# Patient Record
Sex: MALE | Race: WHITE | Employment: FULL TIME | URBAN - METROPOLITAN AREA
[De-identification: names, ages, dates, MRNs, and addresses within clinical notes are randomized per-mention and may not be internally consistent; named-entity substitution may affect disease eponyms.]

---

## 2018-01-24 PROBLEM — E66.01 OBESITY, MORBID (HCC): Status: ACTIVE | Noted: 2018-01-24

## 2018-01-24 PROBLEM — M17.0 ARTHRITIS OF BOTH KNEES: Status: ACTIVE | Noted: 2018-01-24

## 2018-06-27 ENCOUNTER — HOSPITAL ENCOUNTER (OUTPATIENT)
Dept: LAB | Age: 58
Discharge: HOME | End: 2018-06-27
Attending: ORTHOPAEDIC SURGERY
Payer: COMMERCIAL

## 2018-06-27 VITALS — HEIGHT: 66 IN | WEIGHT: 230 LBS | BODY MASS INDEX: 36.96 KG/M2

## 2018-06-27 LAB — POTASSIUM SERPL-SCNC: 3.5 MMOL/L (ref 3.5–5.1)

## 2018-06-27 PROCEDURE — 36415 COLL VENOUS BLD VENIPUNCTURE: CPT | Performed by: ANESTHESIOLOGY

## 2018-06-27 PROCEDURE — 84132 ASSAY OF SERUM POTASSIUM: CPT | Performed by: ANESTHESIOLOGY

## 2018-06-27 NOTE — PERIOP NOTES
Patient's wife verified name, , and surgery as listed in [facility]. Type 1B surgery, PAT phone assessment complete. Orders not received. Labs per surgeon: no orders at this time   Labs per anesthesia protocol: K+- drawn today within anesthesia guidelines. Patient's wife answered medical/surgical history questions at their best of ability. All prior to admission medications documented in [facility]. Patient's wife instructed to take the following medications the day of surgery according to anesthesia guidelines with a small sip of water: norvasc. Hold all vitamins 7 days prior to surgery and NSAIDS 5 days prior to surgery. Medications to be held - aleve. Patient's wife instructed on the following:  Arrive at A Entrance, time of arrival to be called the day before by 1700  NPO after midnight including gum, mints, and ice chips  Responsible adult must drive patient to the hospital, stay during surgery, and patient will need supervision 24 hours after anesthesia  Use antibacterial soap in shower the night before surgery and on the morning of surgery  Leave all valuables (money and jewelry) at home but bring insurance card and ID on DOS  Do not wear make-up, nail polish, lotions, cologne, perfumes, powders, or oil on skin. Patient teach back successful and patient demonstrates knowledge of instruction.

## 2018-06-27 NOTE — PERIOP NOTES
Pt came to o/p registration stating that he had missed a call and because he is [place], he\" just came to see\" what the phone call was pertaining to. Pt informed that he will be called for a phone assessment from a preassessment nurse for a pre-surgical anesthesia assessment. Pt states he is [place] and would like us to call his wife, Mustapha Medina, to complete the assessment. Obtained pt's wife's work #[phone] and informed pt we will call his wife today. This RN asked pt if he had hx of anemia or took a diuretic medication- pt denies hx of anemia but states does take a \"water pill\" for blood pressure. Pt instructed to go to o/p lab at Entrance B for K+ collection (required per anesthesia protocol) prior to leaving the dg today.

## 2018-06-28 ENCOUNTER — HOSPITAL ENCOUNTER (OUTPATIENT)
Dept: SURGERY | Age: 58
Discharge: HOME | End: 2018-06-28

## 2018-06-30 PROBLEM — M17.11 OSTEOARTHRITIS OF RIGHT KNEE: Status: ACTIVE | Noted: 2018-06-30

## 2018-06-30 PROBLEM — S83.231A COMPLEX TEAR OF MEDIAL MENISCUS OF RIGHT KNEE: Status: ACTIVE | Noted: 2018-06-30

## 2018-06-30 NOTE — BRIEF OP NOTE
BRIEF OPERATIVE NOTE    Date of Procedure: 7/6/2018     Preoperative Diagnosis:  MEDIAL MENISCAL TEAR RIGHT KNEE      OA RIGHT KNEE    Postoperative Diagnosis:  SAME      LATERAL MENISCAL TEAR RIGHT KNEE    Procedure(s): ARTHROSCOPY RIGHT KNEE PARTIAL MEDIAL AND LATERAL MENISECTOMIES    Surgeon(s) and Role:     * Gali Wilson MD - Primary         Assistant Staff: None      Surgical Staff:  Circ-1: (Unknown)  Scrub Tech-1: (Unknown)  Scrub Tech-2: (Unknown)  Scrub Tech-3: (Unknown)  No case tracking events are documented in the log. Anesthesia:  GENERAL    Estimated Blood Loss: 10 cc.       Complications: Letty Giraldo MD

## 2018-06-30 NOTE — H&P
[facility] HISTORY AND PHYSICAL    Subjective:     Patient is a 59 y.o. MALE WITH RIGHT KNEE PAIN. SEE OFFICE NOTE. Patient Active Problem List    Diagnosis Date Noted    Complex tear of medial meniscus of right knee 06/30/2018    Osteoarthritis of right knee 06/30/2018    Obesity, morbid ([address] 01/24/2018    Arthritis of both knees 01/24/2018    Elevated ferritin 07/14/2016    Hyperlipidemia 12/16/2015    Shoulder arthritis 49/60/3342    Umbilical hernia 42/39/0754    HTN (hypertension), benign 10/13/2015    Encounter for long-term (current) drug use 10/13/2015     Past Medical History:   Diagnosis Date    Arthritis     Encounter for long-term (current) drug use 10/13/2015    HTN (hypertension), benign 10/13/2015    Controlled with meds     Hypercholesteremia     Controlled with meds     Snores       Past Surgical History:   Procedure Laterality Date    HX ORTHOPAEDIC Left     left knee    HX ORTHOPAEDIC  3/1976    left shoulder    HX OTHER SURGICAL      sinus      Prior to Admission medications    Medication Sig Start Date End Date Taking? Authorizing Provider   valsartan-hydroCHLOROthiazide (DIOVAN-HCT) 160-12.5 mg per tablet Take 1 Tab by mouth daily. 7/19/17   Junior Serrano MD   gemfibrozil (LOPID) 600 mg tablet Take 1 Tab by mouth daily. 7/19/17   Junior Serrano MD   amLODIPine (NORVASC) 5 mg tablet Take 1 Tab by mouth daily. 7/19/17   Junior Serrano MD   naproxen sodium (ALEVE) 220 mg tablet Take 1 Tab by mouth two (2) times daily as needed.  1/17/17   Junior Serrano MD     No Known Allergies   Social History   Substance Use Topics    Smoking status: Light Tobacco Smoker    Smokeless tobacco: Never Used      Comment: Occassionally smokes/chews a cigar     Alcohol use 0.0 oz/week     0 Standard drinks or equivalent per week      Comment: occasional liquor/wine/beer      Family History   Problem Relation Age of Onset    Hypertension Mother    [facility] Hypertension Father     Stroke Father       Review of Systems  A comprehensive review of systems was negative except for that written in the HPI. Objective:     No data found. There were no vitals taken for this visit. General:  Alert, cooperative, no distress, appears stated age. Head:  Normocephalic, without obvious abnormality, atraumatic. Eyes:  Conjunctivae/corneas clear. PERRL, EOMs intact. Fundi benign                   Back:   Symmetric, no curvature. ROM normal. No CVA tenderness. Lungs:   Clear to auscultation bilaterally. Chest wall:  No tenderness or deformity. Heart:  Regular rate and rhythm, S1, S2 normal, no murmur, click, rub or gallop. Extremities: Extremities normal, atraumatic, no cyanosis or edema. Pulses: 2+ and symmetric all extremities. Skin: Skin color, texture, turgor normal. No rashes or lesions   Lymph nodes: Cervical, supraclavicular, and axillary nodes normal.   Neurologic: CNII-XII intact. Normal strength, sensation and reflexes throughout. Assessment:     Principal Problem:    Complex tear of medial meniscus of right knee (6/30/2018)    Active Problems:    Osteoarthritis of right knee (6/30/2018)        Plan:     The various methods of treatment have been discussed with the patient and family. PATIENT HAS EXHAUSTED NON-OPERATIVE MODALITIES     After consideration of risks, benefits and other options for treatment, the patient has consented to surgical intervention.     SEE OFFICE NOTE    Sophia Garcia MD

## 2018-07-05 ENCOUNTER — ANESTHESIA EVENT (OUTPATIENT)
Dept: SURGERY | Age: 58
End: 2018-07-05
Payer: COMMERCIAL

## 2018-07-05 RX ORDER — SODIUM CHLORIDE 0.9 % (FLUSH) 0.9 %
5-10 SYRINGE (ML) INJECTION EVERY 8 HOURS
Status: CANCELLED | OUTPATIENT
Start: 2018-07-05

## 2018-07-05 RX ORDER — SODIUM CHLORIDE 0.9 % (FLUSH) 0.9 %
5-10 SYRINGE (ML) INJECTION AS NEEDED
Status: CANCELLED | OUTPATIENT
Start: 2018-07-05

## 2018-07-06 ENCOUNTER — ANESTHESIA (OUTPATIENT)
Dept: SURGERY | Age: 58
End: 2018-07-06
Payer: COMMERCIAL

## 2018-07-06 ENCOUNTER — HOSPITAL ENCOUNTER (OUTPATIENT)
Age: 58
Setting detail: OUTPATIENT SURGERY
Discharge: HOME | End: 2018-07-06
Attending: ORTHOPAEDIC SURGERY | Admitting: ORTHOPAEDIC SURGERY
Payer: COMMERCIAL

## 2018-07-06 VITALS
BODY MASS INDEX: 36.96 KG/M2 | RESPIRATION RATE: 16 BRPM | HEIGHT: 66 IN | DIASTOLIC BLOOD PRESSURE: 89 MMHG | HEART RATE: 72 BPM | TEMPERATURE: 98.1 F | OXYGEN SATURATION: 98 % | WEIGHT: 230 LBS | SYSTOLIC BLOOD PRESSURE: 136 MMHG

## 2018-07-06 PROBLEM — S83.281A TEAR OF LATERAL MENISCUS OF RIGHT KNEE: Status: ACTIVE | Noted: 2018-07-06

## 2018-07-06 PROCEDURE — 76010000138 HC OR TIME 0.5 TO 1 HR: Performed by: ORTHOPAEDIC SURGERY

## 2018-07-06 PROCEDURE — 74011250636 HC RX REV CODE- 250/636

## 2018-07-06 PROCEDURE — 77030033005 HC TBNG ARTHSC PMP STRY -B: Performed by: ORTHOPAEDIC SURGERY

## 2018-07-06 PROCEDURE — 77030003602 HC NDL NRV BLK BBMI -B: Performed by: ANESTHESIOLOGY

## 2018-07-06 PROCEDURE — 77030003666 HC NDL SPINAL BD -A: Performed by: ORTHOPAEDIC SURGERY

## 2018-07-06 PROCEDURE — 77030006891 HC BLD SHV RESECT STRY -B: Performed by: ORTHOPAEDIC SURGERY

## 2018-07-06 PROCEDURE — 77030020753 HC CUF TRNQT 1BLA STRY -B: Performed by: ORTHOPAEDIC SURGERY

## 2018-07-06 PROCEDURE — 77030018836 HC SOL IRR NACL ICUM -A: Performed by: ORTHOPAEDIC SURGERY

## 2018-07-06 PROCEDURE — 76060000032 HC ANESTHESIA 0.5 TO 1 HR: Performed by: ORTHOPAEDIC SURGERY

## 2018-07-06 PROCEDURE — 77030006668 HC BLD SHV MENSCS STRY -B: Performed by: ORTHOPAEDIC SURGERY

## 2018-07-06 PROCEDURE — 74011000250 HC RX REV CODE- 250: Performed by: ORTHOPAEDIC SURGERY

## 2018-07-06 PROCEDURE — 74011000250 HC RX REV CODE- 250: Performed by: ANESTHESIOLOGY

## 2018-07-06 PROCEDURE — 77030002916 HC SUT ETHLN J&J -A: Performed by: ORTHOPAEDIC SURGERY

## 2018-07-06 PROCEDURE — 74011250637 HC RX REV CODE- 250/637: Performed by: ANESTHESIOLOGY

## 2018-07-06 PROCEDURE — 74011250636 HC RX REV CODE- 250/636: Performed by: ORTHOPAEDIC SURGERY

## 2018-07-06 PROCEDURE — 77030020143 HC AIRWY LARYN INTUB CGAS -A: Performed by: ANESTHESIOLOGY

## 2018-07-06 PROCEDURE — 74011250636 HC RX REV CODE- 250/636: Performed by: ANESTHESIOLOGY

## 2018-07-06 PROCEDURE — 74011000250 HC RX REV CODE- 250

## 2018-07-06 PROCEDURE — 77030022036 HC BLD SHV TOMCAT STRY -B: Performed by: ORTHOPAEDIC SURGERY

## 2018-07-06 PROCEDURE — 76210000006 HC OR PH I REC 0.5 TO 1 HR: Performed by: ORTHOPAEDIC SURGERY

## 2018-07-06 PROCEDURE — 77030020782 HC GWN BAIR PAWS FLX 3M -B: Performed by: ANESTHESIOLOGY

## 2018-07-06 RX ORDER — DEXAMETHASONE SODIUM PHOSPHATE 4 MG/ML
INJECTION, SOLUTION INTRA-ARTICULAR; INTRALESIONAL; INTRAMUSCULAR; INTRAVENOUS; SOFT TISSUE AS NEEDED
Status: DISCONTINUED | OUTPATIENT
Start: 2018-07-06 | End: 2018-07-06 | Stop reason: HOSPADM

## 2018-07-06 RX ORDER — SODIUM CHLORIDE, SODIUM LACTATE, POTASSIUM CHLORIDE, CALCIUM CHLORIDE 600; 310; 30; 20 MG/100ML; MG/100ML; MG/100ML; MG/100ML
100 INJECTION, SOLUTION INTRAVENOUS CONTINUOUS
Status: DISCONTINUED | OUTPATIENT
Start: 2018-07-06 | End: 2018-07-06 | Stop reason: HOSPADM

## 2018-07-06 RX ORDER — OXYCODONE HYDROCHLORIDE 5 MG/1
10 TABLET ORAL
Status: DISCONTINUED | OUTPATIENT
Start: 2018-07-06 | End: 2018-07-06 | Stop reason: HOSPADM

## 2018-07-06 RX ORDER — ROPIVACAINE HYDROCHLORIDE 5 MG/ML
INJECTION, SOLUTION EPIDURAL; INFILTRATION; PERINEURAL AS NEEDED
Status: DISCONTINUED | OUTPATIENT
Start: 2018-07-06 | End: 2018-07-06 | Stop reason: HOSPADM

## 2018-07-06 RX ORDER — SODIUM CHLORIDE 0.9 % (FLUSH) 0.9 %
5-10 SYRINGE (ML) INJECTION AS NEEDED
Status: DISCONTINUED | OUTPATIENT
Start: 2018-07-06 | End: 2018-07-06 | Stop reason: HOSPADM

## 2018-07-06 RX ORDER — DEXAMETHASONE SODIUM PHOSPHATE 100 MG/10ML
INJECTION INTRAMUSCULAR; INTRAVENOUS AS NEEDED
Status: DISCONTINUED | OUTPATIENT
Start: 2018-07-06 | End: 2018-07-06 | Stop reason: HOSPADM

## 2018-07-06 RX ORDER — ACETAMINOPHEN 500 MG
1000 TABLET ORAL ONCE
Status: COMPLETED | OUTPATIENT
Start: 2018-07-06 | End: 2018-07-06

## 2018-07-06 RX ORDER — LIDOCAINE HYDROCHLORIDE 10 MG/ML
0.3 INJECTION INFILTRATION; PERINEURAL ONCE
Status: COMPLETED | OUTPATIENT
Start: 2018-07-06 | End: 2018-07-06

## 2018-07-06 RX ORDER — MIDAZOLAM HYDROCHLORIDE 1 MG/ML
2 INJECTION, SOLUTION INTRAMUSCULAR; INTRAVENOUS
Status: COMPLETED | OUTPATIENT
Start: 2018-07-06 | End: 2018-07-06

## 2018-07-06 RX ORDER — HYDROMORPHONE HYDROCHLORIDE 2 MG/ML
0.5 INJECTION, SOLUTION INTRAMUSCULAR; INTRAVENOUS; SUBCUTANEOUS
Status: DISCONTINUED | OUTPATIENT
Start: 2018-07-06 | End: 2018-07-06 | Stop reason: HOSPADM

## 2018-07-06 RX ORDER — KETOROLAC TROMETHAMINE 30 MG/ML
INJECTION, SOLUTION INTRAMUSCULAR; INTRAVENOUS AS NEEDED
Status: DISCONTINUED | OUTPATIENT
Start: 2018-07-06 | End: 2018-07-06 | Stop reason: HOSPADM

## 2018-07-06 RX ORDER — ONDANSETRON 2 MG/ML
INJECTION INTRAMUSCULAR; INTRAVENOUS AS NEEDED
Status: DISCONTINUED | OUTPATIENT
Start: 2018-07-06 | End: 2018-07-06 | Stop reason: HOSPADM

## 2018-07-06 RX ORDER — PROPOFOL 10 MG/ML
INJECTION, EMULSION INTRAVENOUS AS NEEDED
Status: DISCONTINUED | OUTPATIENT
Start: 2018-07-06 | End: 2018-07-06 | Stop reason: HOSPADM

## 2018-07-06 RX ORDER — CEFAZOLIN SODIUM/WATER 2 G/20 ML
2 SYRINGE (ML) INTRAVENOUS ONCE
Status: COMPLETED | OUTPATIENT
Start: 2018-07-06 | End: 2018-07-06

## 2018-07-06 RX ORDER — LIDOCAINE HYDROCHLORIDE 20 MG/ML
INJECTION, SOLUTION EPIDURAL; INFILTRATION; INTRACAUDAL; PERINEURAL AS NEEDED
Status: DISCONTINUED | OUTPATIENT
Start: 2018-07-06 | End: 2018-07-06 | Stop reason: HOSPADM

## 2018-07-06 RX ORDER — FENTANYL CITRATE 50 UG/ML
INJECTION, SOLUTION INTRAMUSCULAR; INTRAVENOUS AS NEEDED
Status: DISCONTINUED | OUTPATIENT
Start: 2018-07-06 | End: 2018-07-06 | Stop reason: HOSPADM

## 2018-07-06 RX ORDER — LIDOCAINE HYDROCHLORIDE AND EPINEPHRINE 10; 10 MG/ML; UG/ML
INJECTION, SOLUTION INFILTRATION; PERINEURAL AS NEEDED
Status: DISCONTINUED | OUTPATIENT
Start: 2018-07-06 | End: 2018-07-06 | Stop reason: HOSPADM

## 2018-07-06 RX ADMIN — FENTANYL CITRATE 50 MCG: 50 INJECTION, SOLUTION INTRAMUSCULAR; INTRAVENOUS at 07:15

## 2018-07-06 RX ADMIN — ACETAMINOPHEN 1000 MG: 500 TABLET, FILM COATED ORAL at 06:16

## 2018-07-06 RX ADMIN — HYDROMORPHONE HYDROCHLORIDE 0.5 MG: 2 INJECTION, SOLUTION INTRAMUSCULAR; INTRAVENOUS; SUBCUTANEOUS at 08:08

## 2018-07-06 RX ADMIN — PROPOFOL 200 MG: 10 INJECTION, EMULSION INTRAVENOUS at 07:14

## 2018-07-06 RX ADMIN — LIDOCAINE HYDROCHLORIDE 100 MG: 20 INJECTION, SOLUTION EPIDURAL; INFILTRATION; INTRACAUDAL; PERINEURAL at 07:14

## 2018-07-06 RX ADMIN — HYDROMORPHONE HYDROCHLORIDE 0.5 MG: 2 INJECTION, SOLUTION INTRAMUSCULAR; INTRAVENOUS; SUBCUTANEOUS at 08:00

## 2018-07-06 RX ADMIN — SODIUM CHLORIDE, SODIUM LACTATE, POTASSIUM CHLORIDE, AND CALCIUM CHLORIDE: 600; 310; 30; 20 INJECTION, SOLUTION INTRAVENOUS at 07:30

## 2018-07-06 RX ADMIN — SODIUM CHLORIDE, SODIUM LACTATE, POTASSIUM CHLORIDE, AND CALCIUM CHLORIDE 100 ML/HR: 600; 310; 30; 20 INJECTION, SOLUTION INTRAVENOUS at 06:31

## 2018-07-06 RX ADMIN — Medication 2 G: at 07:09

## 2018-07-06 RX ADMIN — ONDANSETRON 4 MG: 2 INJECTION INTRAMUSCULAR; INTRAVENOUS at 07:14

## 2018-07-06 RX ADMIN — LIDOCAINE HYDROCHLORIDE 0.3 ML: 10 INJECTION, SOLUTION INFILTRATION; PERINEURAL at 06:30

## 2018-07-06 RX ADMIN — MIDAZOLAM HYDROCHLORIDE 2 MG: 1 INJECTION, SOLUTION INTRAMUSCULAR; INTRAVENOUS at 06:52

## 2018-07-06 RX ADMIN — FENTANYL CITRATE 50 MCG: 50 INJECTION, SOLUTION INTRAMUSCULAR; INTRAVENOUS at 07:09

## 2018-07-06 RX ADMIN — KETOROLAC TROMETHAMINE 30 MG: 30 INJECTION, SOLUTION INTRAMUSCULAR; INTRAVENOUS at 07:23

## 2018-07-06 RX ADMIN — DEXAMETHASONE SODIUM PHOSPHATE 10 MG: 4 INJECTION, SOLUTION INTRA-ARTICULAR; INTRALESIONAL; INTRAMUSCULAR; INTRAVENOUS; SOFT TISSUE at 07:14

## 2018-07-06 NOTE — DISCHARGE INSTRUCTIONS
Knee Arthroscopy: What to Expect at Home  Your Recovery    Arthroscopy is a way to find problems and do surgery inside a joint without making a large cut (incision). Your doctor put a lighted tube with a tiny camera-called an arthroscope, or scope-and surgical tools through small incisions in your knee. You will feel tired for several days. Your knee will be swollen, and you may notice that your skin is a different color near the cuts (incisions). The swelling is normal and will start to go away in a few days. Keeping your leg higher than your heart will help with swelling and pain. You will probably need about 6 weeks to recover. If your doctor repaired damaged tissue, recovery will take longer. You may have to limit your activity until your knee strength and movement return to normal. You may also be in a physical rehabilitation (rehab) program.  You may be able to return to a desk job or your normal routine in a few days. But if you do physical labor, it may be as long as 2 months before you can return to work. This care sheet gives you a general idea about how long it will take for you to recover. But each person recovers at a different pace. Follow the steps below to get better as quickly as possible. How can you care for yourself at home? Activity  ? · Rest when you feel tired. Getting enough sleep will help you recover. Use pillows to raise your ankle and leg above the level of your heart. ? · Try to walk each day, after your doctor has said you can. Start by walking a little more than you did the day before. Bit by bit, increase the amount you walk. Walking boosts blood flow and helps prevent pneumonia and constipation. ? · You may have a brace or crutches or both. ? · Your doctor will tell you how often and how much you can move your leg and knee. ? · If you have a desk job, you may be able to return to work a few days after the surgery.  If you lift things or stand or walk a lot at work, it may be as long as 2 months before you can return. ? · You can take a shower 48 to 72 hours after surgery and clean the incisions with regular soap and water. Do not take a bath or soak your knee until your doctor says it is okay. ? · Ask your doctor when you can drive again. ? · If you had a repair of torn tissue, follow your doctor's instructions for lifting things or moving your knee. Diet  ? · You can eat your normal diet. If your stomach is upset, try bland, low-fat foods like plain rice, broiled chicken, toast, and yogurt. ? · Drink plenty of fluids, unless your doctor tells you not to. ? · You may notice that your bowel movements are not regular right after your surgery. This is common. Try to avoid constipation and straining with bowel movements. You may want to take a fiber supplement every day. If you have not had a bowel movement after a couple of days, ask your doctor about taking a mild laxative. Medicines  ? · Your doctor will tell you if and when you can restart your medicines. He or she will also give you instructions about taking any new medicines. ? · If you take blood thinners, such as warfarin (Coumadin), clopidogrel (Plavix), or aspirin, be sure to talk to your doctor. He or she will tell you if and when to start taking those medicines again. Make sure that you understand exactly what your doctor wants you to do. ? · Be safe with medicines. Take pain medicines exactly as directed. ¨ If the doctor gave you a prescription medicine for pain, take it as prescribed. ¨ If you are not taking a prescription pain medicine, ask your doctor if you can take an over-the-counter medicine. ? · If you think your pain medicine is making you sick to your stomach:  ¨ Take your medicine after meals (unless your doctor has told you not to). ¨ Ask your doctor for a different pain medicine. ? · If your doctor prescribed antibiotics, take them as directed.  Do not stop taking them just because you feel better. You need to take the full course of antibiotics. Incision care  ? · If you have a dressing over your cuts (incisions), keep it clean and dry. You may remove it 48 to 72 hours after the surgery. ? · If your incisions are open to the air, keep the area clean and dry. ? · If you have strips of tape on the incisions, leave the tape on for a week or until it falls off. Exercise  ? · Move your toes and ankle as much as your bandages will allow. ? · Bend and straighten your knee slowly several times during the day. ? · Depending on why you had the surgery, you may have to do ankle and leg exercises. Your doctor or physical therapist will give you exercises as part of a rehabilitation program.   ? · Stop any activity that causes sharp pain. Talk to your doctor or physical therapist about what sports or other exercise you can do. Ice and elevation  ? · To reduce swelling and pain, put ice or a cold pack on your knee for 10 to 20 minutes at a time. Do this every 1 to 2 hours. Put a thin cloth between the ice and your skin. Follow-up care is a key part of your treatment and safety. Be sure to make and go to all appointments, and call your doctor if you are having problems. It's also a good idea to know your test results and keep a list of the medicines you take. When should you call for help? Call 911 anytime you think you may need emergency care. For example, call if:  ? · You passed out (lost consciousness). ? · You have severe trouble breathing. ? · You have sudden chest pain and shortness of breath, or you cough up blood. ?Call your doctor now or seek immediate medical care if:  ? · Your foot or toes are numb or tingling. ? · Your foot is cool or pale, or it changes color. ? · You have signs of a blood clot, such as:  ¨ Pain in your calf, back of the knee, thigh, or groin. ¨ Redness and swelling in your leg or groin.    ? · You are sick to your stomach or cannot keep fluids down.   ? · You have pain that does not get better after you take pain medicine. ? · You have loose stitches, or your incision comes open. ? · Bright red blood has soaked through the bandage over your incision. ? · You have signs of infection, such as:  ¨ Increased pain, swelling, warmth, or redness. ¨ Red streaks leading from the incisions. ¨ Pus draining from the incisions. ¨ A fever. ? Watch closely for any changes in your health, and be sure to contact your doctor if:  ? · You do not have a bowel movement after taking a laxative. Where can you learn more? Go to http://cyndy-gema.info/. Enter Y744 in the search box to learn more about \"Knee Arthroscopy: What to Expect at Home. \"  Current as of: March 21, 2017  Content Version: 11.4  © 2079-0111 [employer]. Care instructions adapted under license by [employer] (which disclaims liability or warranty for this information). If you have questions about a medical condition or this instruction, always ask your healthcare professional. [address] any warranty or liability for your use of this information.

## 2018-07-06 NOTE — IP AVS SNAPSHOT
[facility] [address] 
[phone] Patient: Rylie Nino MRN: [id number] SXA:[id number] About your hospitalization You were admitted on:  July 6, 2018 You last received care in the:  [facility] PACU You were discharged on:  July 6, 2018 Why you were hospitalized Your primary diagnosis was:  Complex Tear Of Medial Meniscus Of Right Knee Your diagnoses also included:  Osteoarthritis Of Right Knee, Tear Of Lateral Meniscus Of Right Knee Follow-up Information Follow up With Details Comments Contact Info Clarita Lerma MD   [address] [facility] [place] [address] 
[phone] Your Scheduled Appointments Wednesday July 25, 2018  9:15 AM EDT  
6 MONTH with Clarita Lerma MD  
[facility] ([address] [place] [address]) [address] [place] [address] [place] [address] [phone] Discharge Orders None A check alvin indicates which time of day the medication should be taken. My Medications ASK your doctor about these medications Instructions Each Dose to Equal  
 Morning Noon Evening Bedtime ALEVE 220 mg tablet Generic drug:  naproxen sodium Your last dose was: Your next dose is: Take 1 Tab by mouth two (2) times daily as needed. 220 mg  
    
   
   
   
  
 amLODIPine 5 mg tablet Commonly known as:  Tommy Kristy Your last dose was: Your next dose is: Take 1 Tab by mouth daily. 5 mg  
    
   
   
   
  
 gemfibrozil 600 mg tablet Commonly known as:  LOPID Your last dose was: Your next dose is: Take 1 Tab by mouth daily. 600 mg  
    
   
   
   
  
 valsartan-hydroCHLOROthiazide 160-12.5 mg per tablet Commonly known as:  DIOVAN-HCT Your last dose was: Your next dose is: Take 1 Tab by mouth daily. 1 Tab Discharge Instructions Knee Arthroscopy: What to Expect at [facility] Your Recovery Arthroscopy is a way to find problems and do surgery inside a joint without making a large cut (incision). Your doctor put a lighted tube with a tiny camera-called an arthroscope, or scope-and surgical tools through small incisions in your knee. You will feel tired for several days. Your knee will be swollen, and you may notice that your skin is a different color near the cuts (incisions). The swelling is normal and will start to go away in a few days. Keeping your leg higher than your heart will help with swelling and pain. You will probably need about 6 weeks to recover. If your doctor repaired damaged tissue, recovery will take longer. You may have to limit your activity until your knee strength and movement return to normal. You may also be in a physical rehabilitation (rehab) program. 
You may be able to return to a desk job or your normal routine in a few days. But if you do physical labor, it may be as long as 2 months before you can return to work. This care sheet gives you a general idea about how long it will take for you to recover. But each person recovers at a different pace. Follow the steps below to get better as quickly as possible. How can you care for yourself at home? Activity ? · Rest when you feel tired. Getting enough sleep will help you recover. Use pillows to raise your ankle and leg above the level of your heart. ? · Try to walk each day, after your doctor has said you can. Start by walking a little more than you did the day before. Bit by bit, increase the amount you walk. Walking boosts blood flow and helps prevent pneumonia and constipation. ? · You may have a brace or crutches or both. ? · Your doctor will tell you how often and how much you can move your leg and knee.   
? · If you have a desk job, you may be able to return to work a few days after the surgery. If you lift things or stand or walk a lot at work, it may be as long as 2 months before you can return. ? · You can take a shower 48 to 72 hours after surgery and clean the incisions with regular soap and water. Do not take a bath or soak your knee until your doctor says it is okay. ? · Ask your doctor when you can drive again. ? · If you had a repair of torn tissue, follow your doctor's instructions for lifting things or moving your knee. Diet ? · You can eat your normal diet. If your stomach is upset, try bland, low-fat foods like plain rice, broiled chicken, toast, and yogurt. ? · Drink plenty of fluids, unless your doctor tells you not to. ? · You may notice that your bowel movements are not regular right after your surgery. This is common. Try to avoid constipation and straining with bowel movements. You may want to take a fiber supplement every day. If you have not had a bowel movement after a couple of days, ask your doctor about taking a mild laxative. Medicines ? · Your doctor will tell you if and when you can restart your medicines. He or she will also give you instructions about taking any new medicines. ? · If you take blood thinners, such as warfarin (Coumadin), clopidogrel (Plavix), or aspirin, be sure to talk to your doctor. He or she will tell you if and when to start taking those medicines again. Make sure that you understand exactly what your doctor wants you to do. ? · Be safe with medicines. Take pain medicines exactly as directed. ¨ If the doctor gave you a prescription medicine for pain, take it as prescribed. ¨ If you are not taking a prescription pain medicine, ask your doctor if you can take an over-the-counter medicine. ? · If you think your pain medicine is making you sick to your stomach: 
¨ Take your medicine after meals (unless your doctor has told you not to). ¨ Ask your doctor for a different pain medicine. ? · If your doctor prescribed antibiotics, take them as directed. Do not stop taking them just because you feel better. You need to take the full course of antibiotics. Incision care ? · If you have a dressing over your cuts (incisions), keep it clean and dry. You may remove it 48 to 72 hours after the surgery. ? · If your incisions are open to the air, keep the area clean and dry. ? · If you have strips of tape on the incisions, leave the tape on for a week or until it falls off. Exercise ? · Move your toes and ankle as much as your bandages will allow. ? · Bend and straighten your knee slowly several times during the day. ? · Depending on why you had the surgery, you may have to do ankle and leg exercises. Your doctor or physical therapist will give you exercises as part of a rehabilitation program.  
? · Stop any activity that causes sharp pain. Talk to your doctor or physical therapist about what sports or other exercise you can do. Ice and elevation ? · To reduce swelling and pain, put ice or a cold pack on your knee for 10 to 20 minutes at a time. Do this every 1 to 2 hours. Put a thin cloth between the ice and your skin. Follow-up care is a key part of your treatment and safety. Be sure to make and go to all appointments, and call your doctor if you are having problems. It's also a good idea to know your test results and keep a list of the medicines you take. When should you call for help? Call 911 anytime you think you may need emergency care. For example, call if: 
? · You passed out (lost consciousness). ? · You have severe trouble breathing. ? · You have sudden chest pain and shortness of breath, or you cough up blood. ?Call your doctor now or seek immediate medical care if: 
? · Your foot or toes are numb or tingling. ? · Your foot is cool or pale, or it changes color. ? · You have signs of a blood clot, such as: 
¨ Pain in your calf, back of the knee, thigh, or groin. ¨ Redness and swelling in your leg or groin. ? · You are sick to your stomach or cannot keep fluids down. ? · You have pain that does not get better after you take pain medicine. ? · You have loose stitches, or your incision comes open. ? · Bright red blood has soaked through the bandage over your incision. ? · You have signs of infection, such as: 
¨ Increased pain, swelling, warmth, or redness. ¨ Red streaks leading from the incisions. ¨ Pus draining from the incisions. ¨ A fever. ? Watch closely for any changes in your health, and be sure to contact your doctor if: 
? · You do not have a bowel movement after taking a laxative. Where can you learn more? Go to http://cyndy-gema.info/. Enter T035 in the search box to learn more about \"Knee Arthroscopy: What to Expect at Home. \" Current as of: March 21, 2017 Content Version: 11.4 © 5919-0634 [employer]. Care instructions adapted under license by [employer] (which disclaims liability or warranty for this information). If you have questions about a medical condition or this instruction, always ask your healthcare professional. [place] [address] any warranty or liability for your use of this information. Introducing [facility] & HEALTH SERVICES! Dear Lisandro: Thank you for requesting a Hailo account. Our records indicate that you already have an active Hailo account. You can access your account anytime at [email or web address]. [employer]ight [employer]/[facility] Did you know that you can access your hospital and ER discharge instructions at any time in Hailo? You can also review all of your test results from your hospital stay or ER visit. Additional Information If you have questions, please visit the Frequently Asked Questions section of the Hailo website at [email or web address]. [employer]/[facility]/. Remember, Hailo is NOT to be used for urgent needs.  For medical emergencies, dial 911. Now available from your iPhone and Android! Introducing Alessandro Stovall As a [facility] patient, I wanted to make you aware of our electronic visit tool called Alessandro Stovall. [facility] [employer] 24/7 allows you to connect within minutes with a medical provider 24 hours a day, seven days a week via a mobile device or tablet or logging into a secure website from your computer. You can access Alessandro Stovall from anywhere in the [place]. A virtual visit might be right for you when you have a simple condition and feel like you just dont want to get out of bed, or cant get away from work for an appointment, when your regular [facility] provider is not available (evenings, weekends or holidays), or when youre out of town and need minor care. Electronic visits cost only $49 and if the [facility] 24/7 provider determines a prescription is needed to treat your condition, one can be electronically transmitted to a nearby pharmacy*. Please take a moment to enroll today if you have not already done so. The enrollment process is free and takes just a few minutes. To enroll, please download the [employer] 24/7 sumeet to your tablet or phone, or visit [email or web address] to enroll on your computer. And, as an [address] [place] [address] patient with a betNOW account, the results of your visits will be scanned into your electronic medical record and your primary care provider will be able to view the scanned results. We urge you to continue to see your regular [facility] provider for your ongoing medical care. And while your primary care provider may not be the one available when you seek a Alessandro Stovall virtual visit, the peace of mind you get from getting a real diagnosis real time can be priceless. For more information on Alessandro Stovall, view our Frequently Asked Questions (FAQs) at [email or web address].  
 
Sincerely, 
 
Ching Sequeira MD 
 Chief Medical Officer Alondra Crespo *:  certain medications cannot be prescribed via Alessandro Stovall Providers Seen During Your Hospitalization Provider Specialty Primary office phone Perry Simpson MD Orthopedic Surgery [phone] Your Primary Care Physician (PCP) Primary Care Physician Office Phone Office Fax [address] [phone] You are allergic to the following No active allergies Recent Documentation Height Weight BMI Smoking Status 1.676 m 104.3 kg 37.12 kg/m2 Light Tobacco Smoker Emergency Contacts Name Discharge Info Relation Home Work [facility] DISCHARGE CAREGIVER [3] Spouse [3] [phone] Patient Belongings The following personal items are in your possession at time of discharge: 
  Dental Appliances: None  Visual Aid: None Please provide this summary of care documentation to your next provider. Signatures-by signing, you are acknowledging that this After Visit Summary has been reviewed with you and you have received a copy. Patient Signature:  ____________________________________________________________ Date:  ____________________________________________________________  
  
[facility] Provider Signature:  ____________________________________________________________ Date:  ____________________________________________________________

## 2018-07-06 NOTE — ANESTHESIA POSTPROCEDURE EVALUATION
Post-Anesthesia Evaluation and Assessment    Patient: Jun Pollard MRN: [id number]  SSN: [id number]    YOB: 1960  Age: 59 y.o. Sex: male       Cardiovascular Function/Vital Signs  Visit Vitals    /89 (BP 1 Location: Left arm, BP Patient Position: At rest)    Pulse 72    Temp 36.7 °C (98.1 °F)    Resp 16    Ht 5' 6\" (1.676 m)    Wt 104.3 kg (230 lb)    SpO2 98%    BMI 37.12 kg/m2       Patient is status post general anesthesia for Procedure(s):  ARTHROSCOPY RIGHT KNEE PARTIAL MEDIAL AND LATERAL MENISECTOMIES. Nausea/Vomiting: None    Postoperative hydration reviewed and adequate. Pain:  Pain Scale 1: Numeric (0 - 10) (07/06/18 0848)  Pain Intensity 1: 0 (07/06/18 0848)   Managed    Neurological Status:   Neuro (WDL): Exceptions to WDL (07/06/18 0848)  Neuro  Neurologic State: Alert (07/06/18 0848)  Orientation Level: Oriented X4 (07/06/18 0848)  Cognition: Follows commands (07/06/18 0848)  Speech: Clear (07/06/18 0848)  LUE Motor Response: Purposeful (07/06/18 0848)  LLE Motor Response: Purposeful (07/06/18 0848)  RUE Motor Response: Purposeful (07/06/18 0848)  RLE Motor Response: Other(comment) (surgery) (07/06/18 0848)   At baseline    Mental Status and Level of Consciousness: Alert and oriented     Pulmonary Status:   O2 Device: Room air (07/06/18 0848)   Adequate oxygenation and airway patent    Complications related to anesthesia: None    Post-anesthesia assessment completed.  No concerns    Signed By: Clara Tan MD     July 6, 2018

## 2018-07-06 NOTE — ANESTHESIA PREPROCEDURE EVALUATION
Anesthetic History   No history of anesthetic complications            Review of Systems / Medical History  Patient summary reviewed and pertinent labs reviewed    Pulmonary                Comments: Heavy snoring   Neuro/Psych   Within defined limits           Cardiovascular    Hypertension              Exercise tolerance: >4 METS     GI/Hepatic/Renal  Within defined limits              Endo/Other        Obesity     Other Findings   Comments: Hard of hearing           Physical Exam    Airway  Mallampati: I  TM Distance: > 6 cm  Neck ROM: normal range of motion   Mouth opening: Normal     Cardiovascular    Rhythm: regular  Rate: normal         Dental  No notable dental hx       Pulmonary  Breath sounds clear to auscultation               Abdominal         Other Findings            Anesthetic Plan    ASA: 2  Anesthesia type: general          Induction: Intravenous  Anesthetic plan and risks discussed with: Patient and Spouse

## 2018-07-06 NOTE — OP NOTES
[address] [place] [address] REPORT    Irene Morin  MR#: [id number]  : 1960  ACCOUNT #: [de-identified]   DATE OF SERVICE: 2018    PREOPERATIVE DIAGNOSES:    1. Medial meniscal tear, right knee. 2.  Osteoarthritis, right knee. POSTOPERATIVE DIAGNOSES:  1. Medial meniscal tear, right knee. 2.  Osteoarthritis, right knee. 3.  Lateral meniscal tear, right knee. PROCEDURE PERFORMED:  Arthroscopy right knee, partial medial and lateral meniscectomies. SURGEON:  Mo Her. Panchito Nesbitt MD         PATHOLOGY:  1. Complex medial meniscal tear. 2.  Degenerative lateral meniscal tear. 3.  Osteoarthritis with grade II through IV changes of the trochlea and medial femoral condyle, grade I changes patella, medial subpatellar, lateral femoral condyle, lateral subpatellar. CPT CODES:  30362    ICD 10 CODES:  S83.231, S83.281, M17.11.    ANESTHESIA:  General.    ESTIMATED BLOOD LOSS:  10 mL    COMPLICATIONS:  None. TOURNIQUET:  No tourniquet. INDICATIONS:  The patient is a 76-year-old gentleman who has developed recalcitrant right knee pain. Preoperative physical exam, radiographs and an MR demonstrate medial meniscal tear as well as osteoarthritis, right knee. The patient has exhausted nonoperative modalities, elected for operative intervention. PROCEDURE:  Following identification of the patient, the patient was taken to the operative suite. Following administration of general anesthesia, administration of 2 grams of IV Ancef, the patient was positioned on the operating table in the supine fashion. The right knee was examined under anesthesia and noted to be stable through full range of motion. At this point, the right leg was then prepped and draped in a sterile fashion. The right knee was injected with 20 mL of 1% Xylocaine with epinephrine. At this point, the 2 arthroscopic portal  sites were identified and incised.   Scope was introduced into the knee.  Diagnostic arthroscopy was then commenced. The suprapatellar pouch, medial and lateral gutters were visualized. There was no loose body, foreign body or pathologic plica. Undersurface patella and trochlear groove were visualized. There was grade I chondromalacia patella, grade II through IV chondromalacia change of the trochlea, which were diffuse and left alone. There was some synovitis and this was debrided using a 5.5 full radius resector. At this point, the scope was advanced in the medial compartment. His leg was flexed 30 degrees. Medial meniscus was visualized in entirety as well as through the compartment. There was a complex unstable tear of the posterior horn of the medial meniscus. We used an upgoing flat-biter and side-biter, a partial medial meniscectomy was then performed. This was contoured with a meniscal shaver as well as an underwater Bovie. Meniscus was probed and noted to be stable. Articular surfaces were notable for grade II through IV changes diffusely over the medial femoral condyle which was left alone, grade I changes medial subpatellar which were left alone. Scope was introduced into the notch. ACL and PCL were intact. The scope was advanced to the lateral compartment and it was put in the figure A central compartment was in the figure-of-four position. Lateral meniscus was visualized in its entirety, both above and below the popliteus hiatus. There was a degenerative lateral meniscal tear. We used an upgoing flat-biter and side-biter, a partial laterals meniscectomy was then performed. This was contoured with a meniscal shaver. Meniscus was probed and noted be stable. Articular surfaces were noted for grade I chondromalacia of the lateral femoral condyle and patella which were left alone. At this point, with the procedure complete, the knee was then copiously irrigated. Arthroscopic equipment was removed from the knee.   The portals were approximated using 3-0 nylon horizontal mattress sutures. The knee was injected with 10 mL of Naropin and 10 mg of dexamethasone. A sterile dressing was applied. The patient was then transferred to the recovery room in stable condition.       MD VICTORINO Garrido / RAOUL  D: 07/06/2018 07:55     T: 07/06/2018 08:45  JOB #: [id number]  CC: Cindy Vogel MD

## 2019-01-30 PROBLEM — M17.11 OSTEOARTHRITIS OF RIGHT KNEE: Status: RESOLVED | Noted: 2018-06-30 | Resolved: 2019-01-30

## 2019-01-30 PROBLEM — S83.231A COMPLEX TEAR OF MEDIAL MENISCUS OF RIGHT KNEE: Status: RESOLVED | Noted: 2018-06-30 | Resolved: 2019-01-30

## 2019-01-30 PROBLEM — S83.281A TEAR OF LATERAL MENISCUS OF RIGHT KNEE: Status: RESOLVED | Noted: 2018-07-06 | Resolved: 2019-01-30

## 2022-03-19 PROBLEM — M17.0 ARTHRITIS OF BOTH KNEES: Status: ACTIVE | Noted: 2018-01-24

## 2022-03-19 PROBLEM — E66.01 OBESITY, MORBID (HCC): Status: ACTIVE | Noted: 2018-01-24

## 2022-07-26 RX ORDER — AMLODIPINE BESYLATE 5 MG/1
TABLET ORAL
Qty: 90 TABLET | Refills: 3 | Status: SHIPPED | OUTPATIENT
Start: 2022-07-26

## 2022-07-26 RX ORDER — VALSARTAN AND HYDROCHLOROTHIAZIDE 160; 12.5 MG/1; MG/1
TABLET, FILM COATED ORAL
Qty: 90 TABLET | Refills: 3 | Status: SHIPPED | OUTPATIENT
Start: 2022-07-26

## 2022-08-16 ENCOUNTER — OFFICE VISIT (OUTPATIENT)
Dept: INTERNAL MEDICINE CLINIC | Facility: CLINIC | Age: 62
End: 2022-08-16
Payer: COMMERCIAL

## 2022-08-16 VITALS
TEMPERATURE: 97.3 F | RESPIRATION RATE: 18 BRPM | HEART RATE: 95 BPM | SYSTOLIC BLOOD PRESSURE: 133 MMHG | OXYGEN SATURATION: 99 % | HEIGHT: 67 IN | WEIGHT: 225 LBS | BODY MASS INDEX: 35.31 KG/M2 | DIASTOLIC BLOOD PRESSURE: 82 MMHG

## 2022-08-16 DIAGNOSIS — E78.2 MIXED HYPERLIPIDEMIA: ICD-10-CM

## 2022-08-16 DIAGNOSIS — I10 ESSENTIAL (PRIMARY) HYPERTENSION: ICD-10-CM

## 2022-08-16 DIAGNOSIS — E66.01 MORBID (SEVERE) OBESITY DUE TO EXCESS CALORIES (HCC): Primary | ICD-10-CM

## 2022-08-16 PROCEDURE — 99213 OFFICE O/P EST LOW 20 MIN: CPT | Performed by: INTERNAL MEDICINE

## 2022-08-16 ASSESSMENT — PATIENT HEALTH QUESTIONNAIRE - PHQ9
1. LITTLE INTEREST OR PLEASURE IN DOING THINGS: 0
SUM OF ALL RESPONSES TO PHQ QUESTIONS 1-9: 0
SUM OF ALL RESPONSES TO PHQ9 QUESTIONS 1 & 2: 0
2. FEELING DOWN, DEPRESSED OR HOPELESS: 0
SUM OF ALL RESPONSES TO PHQ QUESTIONS 1-9: 0

## 2022-08-16 NOTE — PROGRESS NOTES
08/16/2022   Location:[facility] [address] [facility]  [place]  Patient #:  [id number]  YOB: 1960        History of Present Illness     Chief Complaint   Patient presents with    Follow-up Chronic Condition     6 month f/u       Mr. Luciano Rogers is a 59 y.o. male  who presents for Follow up on chronic medical issues. There is compliance and tolerance with medications. Chronic acitive medical issues HTN, HLD,elevated ferritin AND OA. HTN and lipids controlled  on current meds.     Saw Chiropractor had adjustment and it has  helped with tinnitis/dizziness  [place] chiropractor     Last Labs  CBC:   Lab Results   Component Value Date/Time    WBC 8.9 02/15/2022 09:19 AM    RBC 5.28 02/15/2022 09:19 AM    HGB 14.5 02/15/2022 09:19 AM    HCT 44.3 02/15/2022 09:19 AM    MCV 84 02/15/2022 09:19 AM    MCH 27.5 02/15/2022 09:19 AM    MCHC 32.7 02/15/2022 09:19 AM    RDW 14.1 02/15/2022 09:19 AM     02/15/2022 09:19 AM     CMP:    Lab Results   Component Value Date/Time     02/15/2022 09:19 AM    K 4.2 02/15/2022 09:19 AM    CL 98 02/15/2022 09:19 AM    CO2 24 02/15/2022 09:19 AM    BUN 19 02/15/2022 09:19 AM    CREATININE 1.10 02/15/2022 09:19 AM    GFRAA 82 02/15/2022 09:19 AM    AGRATIO 2.1 02/15/2022 09:19 AM    GLUCOSE 127 02/15/2022 09:19 AM    PROT 6.8 02/15/2022 09:19 AM    LABALBU 4.6 02/15/2022 09:19 AM    CALCIUM 9.6 02/15/2022 09:19 AM    BILITOT 0.9 02/15/2022 09:19 AM    ALKPHOS 110 02/15/2022 09:19 AM    AST 21 02/15/2022 09:19 AM    ALT 21 02/15/2022 09:19 AM     FLP:    Lab Results   Component Value Date/Time    TRIG 68 01/29/2021 09:09 AM    HDL 36 01/29/2021 09:09 AM    LDLCALC 61 01/29/2021 09:09 AM    LABVLDL 25 01/28/2020 09:25 AM     TSH:    Lab Results   Component Value Date/Time    TSH 1.650 02/15/2022 09:19 AM     IRON:  No results found for: IRON  Iron Saturation:  No components found for: PERCENTFE  TIBC:  No results found for: TIBC  FERRITIN: Lab Results   Component Value Date/Time    FERRITIN 190 01/29/2021 09:09 AM       No Known Allergies  Past Medical History:   Diagnosis Date    Arthritis     Encounter for long-term (current) drug use 10/13/2015    HTN (hypertension), benign 10/13/2015    Controlled with meds     Hypercholesteremia     Controlled with meds     Snores      Social History     Socioeconomic History    Marital status:      Spouse name: None    Number of children: None    Years of education: None    Highest education level: None   Tobacco Use    Smoking status: Light Smoker     Packs/day: 0.00     Types: Cigarettes    Smokeless tobacco: Never    Tobacco comments:     Quit smoking: Occassionally smokes/chews a cigar   Substance and Sexual Activity    Alcohol use: Yes     Alcohol/week: 0.0 standard drinks    Drug use: No     Past Surgical History:   Procedure Laterality Date    ORTHOPEDIC SURGERY Left     left knee    ORTHOPEDIC SURGERY  3/1976    left shoulder    OTHER SURGICAL HISTORY      sinus     Family History   Problem Relation Age of Onset    Hypertension Mother     Cancer Mother         Metastatic, cause of death    Hypertension Father     Stroke Father      Current Outpatient Medications   Medication Sig Dispense Refill    amLODIPine (NORVASC) 5 MG tablet TAKE 1 TABLET BY MOUTH DAILY 90 tablet 3    valsartan-hydroCHLOROthiazide (DIOVAN-HCT) 160-12.5 MG per tablet TAKE 1 TABLET BY MOUTH DAILY 90 tablet 3    atorvastatin (LIPITOR) 20 MG tablet TAKE 1 TABLET BY MOUTH DAILY      Diclofenac Sodium (PENNSAID) 2 % SOLN APPLY 2 PUMPS TO AFFECTED AREA(S) TWO TIMES A DAY AS DIRECTED      naproxen sodium (ANAPROX) 220 MG tablet Take 220 mg by mouth 2 times daily (with meals)       No current facility-administered medications for this visit.      Health Maintenance   Topic Date Due    Pneumococcal 0-64 years Vaccine (1 - PCV) Never done    HIV screen  Never done    Hepatitis C screen  Never done    DTaP/Tdap/Td vaccine (1 - Tdap) Never done    Diabetes screen  Never done    Colorectal Cancer Screen  Never done    Shingles vaccine (1 of 2) Never done    Lipids  01/29/2022    COVID-19 Vaccine (4 - Booster for Moderna series) 06/03/2022    Flu vaccine (1) 09/01/2022    Depression Screen  02/15/2023    Hepatitis A vaccine  Aged Out    Hepatitis B vaccine  Aged Out    Hib vaccine  Aged Out    Meningococcal (ACWY) vaccine  Aged Out             Review of Systems  Review of Systems   Constitutional:  Negative for fatigue. HENT:  Positive for hearing loss. Respiratory:  Negative for cough and shortness of breath. Cardiovascular:  Negative for chest pain. Gastrointestinal:  Negative for abdominal pain. Genitourinary:  Negative for difficulty urinating. /82 (Site: Left Upper Arm, Position: Sitting)   Pulse 95   Temp 97.3 °F (36.3 °C) (Temporal)   Resp 18   Ht 5' 7\" (1.702 m)   Wt 225 lb (102.1 kg)   SpO2 99%   BMI 35.24 kg/m²     Wt Readings from Last 3 Encounters:   08/16/22 225 lb (102.1 kg)   02/15/22 231 lb 6.4 oz (105 kg)   08/18/21 234 lb (106.1 kg)       Physical Exam    Physical Exam  Vitals and nursing note reviewed. Constitutional:       General: He is not in acute distress. Appearance: He is obese. He is not ill-appearing. HENT:      Head: Normocephalic and atraumatic. Cardiovascular:      Rate and Rhythm: Normal rate and regular rhythm. Pulmonary:      Effort: Pulmonary effort is normal.      Breath sounds: Normal breath sounds. Abdominal:      General: Bowel sounds are normal.      Palpations: Abdomen is soft. Lymphadenopathy:      Cervical: No cervical adenopathy. Skin:     General: Skin is warm and dry. Neurological:      General: No focal deficit present. Mental Status: He is alert. Assessment & Plan    Encounter Diagnoses   Name Primary?     Morbid (severe) obesity due to excess calories ([address] Yes    Essential (primary) hypertension     Mixed hyperlipidemia        The patient is asked to make an attempt to improve diet and exercise patterns to aid in medical management of this problem. Chronic medical issues are stable. No change in medications. Return in about 6 months (around 2/16/2023) for fasting labs, routine follow up of chronic medical issues AWV.         Tu Hernandez MD

## 2022-08-22 ASSESSMENT — ENCOUNTER SYMPTOMS
ABDOMINAL PAIN: 0
SHORTNESS OF BREATH: 0
COUGH: 0

## 2023-01-27 RX ORDER — ATORVASTATIN CALCIUM 20 MG/1
TABLET, FILM COATED ORAL
Qty: 90 TABLET | Refills: 3 | Status: SHIPPED | OUTPATIENT
Start: 2023-01-27

## 2023-02-17 SDOH — HEALTH STABILITY: PHYSICAL HEALTH: ON AVERAGE, HOW MANY DAYS PER WEEK DO YOU ENGAGE IN MODERATE TO STRENUOUS EXERCISE (LIKE A BRISK WALK)?: 7 DAYS

## 2023-02-17 SDOH — HEALTH STABILITY: PHYSICAL HEALTH: ON AVERAGE, HOW MANY MINUTES DO YOU ENGAGE IN EXERCISE AT THIS LEVEL?: 90 MIN

## 2023-02-17 ASSESSMENT — PATIENT HEALTH QUESTIONNAIRE - PHQ9
SUM OF ALL RESPONSES TO PHQ QUESTIONS 1-9: 0
SUM OF ALL RESPONSES TO PHQ QUESTIONS 1-9: 0
2. FEELING DOWN, DEPRESSED OR HOPELESS: 0
SUM OF ALL RESPONSES TO PHQ QUESTIONS 1-9: 0
1. LITTLE INTEREST OR PLEASURE IN DOING THINGS: 0
SUM OF ALL RESPONSES TO PHQ9 QUESTIONS 1 & 2: 0
SUM OF ALL RESPONSES TO PHQ QUESTIONS 1-9: 0

## 2023-02-17 ASSESSMENT — LIFESTYLE VARIABLES
HOW OFTEN DO YOU HAVE SIX OR MORE DRINKS ON ONE OCCASION: 1
HOW OFTEN DO YOU HAVE A DRINK CONTAINING ALCOHOL: 3
HOW OFTEN DO YOU HAVE A DRINK CONTAINING ALCOHOL: 2-4 TIMES A MONTH
HOW MANY STANDARD DRINKS CONTAINING ALCOHOL DO YOU HAVE ON A TYPICAL DAY: 1 OR 2
HOW MANY STANDARD DRINKS CONTAINING ALCOHOL DO YOU HAVE ON A TYPICAL DAY: 1

## 2023-02-21 ENCOUNTER — OFFICE VISIT (OUTPATIENT)
Dept: INTERNAL MEDICINE CLINIC | Facility: CLINIC | Age: 63
End: 2023-02-21
Payer: COMMERCIAL

## 2023-02-21 VITALS
DIASTOLIC BLOOD PRESSURE: 84 MMHG | HEART RATE: 82 BPM | WEIGHT: 228 LBS | TEMPERATURE: 98.1 F | SYSTOLIC BLOOD PRESSURE: 142 MMHG | OXYGEN SATURATION: 99 % | HEIGHT: 67 IN | RESPIRATION RATE: 16 BRPM | BODY MASS INDEX: 35.79 KG/M2

## 2023-02-21 DIAGNOSIS — E66.01 OBESITY, MORBID (HCC): ICD-10-CM

## 2023-02-21 DIAGNOSIS — M15.9 PRIMARY OSTEOARTHRITIS INVOLVING MULTIPLE JOINTS: ICD-10-CM

## 2023-02-21 DIAGNOSIS — I10 ESSENTIAL (PRIMARY) HYPERTENSION: Primary | ICD-10-CM

## 2023-02-21 DIAGNOSIS — E78.2 MIXED HYPERLIPIDEMIA: ICD-10-CM

## 2023-02-21 PROCEDURE — 99213 OFFICE O/P EST LOW 20 MIN: CPT | Performed by: INTERNAL MEDICINE

## 2023-02-21 PROCEDURE — 3074F SYST BP LT 130 MM HG: CPT | Performed by: INTERNAL MEDICINE

## 2023-02-21 PROCEDURE — 3078F DIAST BP <80 MM HG: CPT | Performed by: INTERNAL MEDICINE

## 2023-02-21 SDOH — ECONOMIC STABILITY: HOUSING INSECURITY
IN THE LAST 12 MONTHS, WAS THERE A TIME WHEN YOU DID NOT HAVE A STEADY PLACE TO SLEEP OR SLEPT IN A SHELTER (INCLUDING NOW)?: NO

## 2023-02-21 SDOH — ECONOMIC STABILITY: FOOD INSECURITY: WITHIN THE PAST 12 MONTHS, THE FOOD YOU BOUGHT JUST DIDN'T LAST AND YOU DIDN'T HAVE MONEY TO GET MORE.: NEVER TRUE

## 2023-02-21 SDOH — ECONOMIC STABILITY: INCOME INSECURITY: HOW HARD IS IT FOR YOU TO PAY FOR THE VERY BASICS LIKE FOOD, HOUSING, MEDICAL CARE, AND HEATING?: NOT HARD AT ALL

## 2023-02-21 SDOH — ECONOMIC STABILITY: FOOD INSECURITY: WITHIN THE PAST 12 MONTHS, YOU WORRIED THAT YOUR FOOD WOULD RUN OUT BEFORE YOU GOT MONEY TO BUY MORE.: NEVER TRUE

## 2023-02-21 ASSESSMENT — ENCOUNTER SYMPTOMS: SHORTNESS OF BREATH: 0

## 2023-02-21 NOTE — PROGRESS NOTES
02/21/2023   Location:[facility] [address] [facility]  [place]  Patient #:  [id number]  YOB: 1960        History of Present Illness     Chief Complaint   Patient presents with    Follow-up Chronic Condition     6 month f/u    Hypertension    Cholesterol Problem    Arthritis       Mr. Raymundo Sanchez is a 57 y.o. male  who presents for Follow up on chronic medical issues. There is compliance and tolerance with medications. Chronic acitive medical issues HTN, HLD,elevated ferritin AND OA. HTN and lipids controlled  on current meds. No new complaints.      Last Labs  CBC:   Lab Results   Component Value Date/Time    WBC 8.9 02/15/2022 09:19 AM    RBC 5.28 02/15/2022 09:19 AM    HGB 14.5 02/15/2022 09:19 AM    HCT 44.3 02/15/2022 09:19 AM    MCV 84 02/15/2022 09:19 AM    MCH 27.5 02/15/2022 09:19 AM    MCHC 32.7 02/15/2022 09:19 AM    RDW 14.1 02/15/2022 09:19 AM     02/15/2022 09:19 AM     CMP:    Lab Results   Component Value Date/Time     02/15/2022 09:19 AM    K 4.2 02/15/2022 09:19 AM    CL 98 02/15/2022 09:19 AM    CO2 24 02/15/2022 09:19 AM    BUN 19 02/15/2022 09:19 AM    CREATININE 1.10 02/15/2022 09:19 AM    GFRAA 82 02/15/2022 09:19 AM    AGRATIO 2.1 02/15/2022 09:19 AM    GLUCOSE 127 02/15/2022 09:19 AM    PROT 6.8 02/15/2022 09:19 AM    LABALBU 4.6 02/15/2022 09:19 AM    CALCIUM 9.6 02/15/2022 09:19 AM    BILITOT 0.9 02/15/2022 09:19 AM    ALKPHOS 110 02/15/2022 09:19 AM    AST 21 02/15/2022 09:19 AM    ALT 21 02/15/2022 09:19 AM     HgBA1c:  No results found for: LABA1C  Microalbumen/Creatinine ratio:  No components found for: RUCREAT  FLP:    Lab Results   Component Value Date/Time    TRIG 68 01/29/2021 09:09 AM    HDL 36 01/29/2021 09:09 AM    LDLCALC 61 01/29/2021 09:09 AM    LABVLDL 25 01/28/2020 09:25 AM     TSH:    Lab Results   Component Value Date/Time    TSH 1.650 02/15/2022 09:19 AM       No Known Allergies  Past Medical History:   Diagnosis Date    Arthritis     Encounter for long-term (current) drug use 10/13/2015    HTN (hypertension), benign 10/13/2015    Controlled with meds     Hypercholesteremia     Controlled with meds     Snores      Social History     Socioeconomic History    Marital status:      Spouse name: None    Number of children: None    Years of education: None    Highest education level: None   Tobacco Use    Smoking status: Light Smoker     Packs/day: 0.00     Types: Cigarettes    Smokeless tobacco: Never    Tobacco comments:     Quit smoking: Occassionally smokes/chews a cigar   Substance and Sexual Activity    Alcohol use: Yes     Alcohol/week: 0.0 standard drinks    Drug use: No     Social Determinants of Health     Financial Resource Strain: Low Risk     Difficulty of Paying Living Expenses: Not hard at all   Food Insecurity: No Food Insecurity    Worried About Running Out of Food in the Last Year: Never true    Ran Out of Food in the Last Year: Never true   Transportation Needs: Unknown    Lack of Transportation (Non-Medical):  No   Physical Activity: Sufficiently Active    Days of Exercise per Week: 7 days    Minutes of Exercise per Session: 90 min   Housing Stability: Unknown    Unstable Housing in the Last Year: No     Past Surgical History:   Procedure Laterality Date    ORTHOPEDIC SURGERY Left     left knee    ORTHOPEDIC SURGERY  3/1976    left shoulder    OTHER SURGICAL HISTORY      sinus     Family History   Problem Relation Age of Onset    Hypertension Mother     Cancer Mother         Metastatic, cause of death    Hypertension Father     Stroke Father      Current Outpatient Medications   Medication Sig Dispense Refill    atorvastatin (LIPITOR) 20 MG tablet TAKE 1 TABLET BY MOUTH DAILY 90 tablet 3    amLODIPine (NORVASC) 5 MG tablet TAKE 1 TABLET BY MOUTH DAILY 90 tablet 3    valsartan-hydroCHLOROthiazide (DIOVAN-HCT) 160-12.5 MG per tablet TAKE 1 TABLET BY MOUTH DAILY 90 tablet 3    Diclofenac Sodium (PENNSAID) 2 % SOLN APPLY 2 PUMPS TO AFFECTED AREA(S) TWO TIMES A DAY AS DIRECTED      naproxen sodium (ANAPROX) 220 MG tablet Take 220 mg by mouth 2 times daily (with meals)       No current facility-administered medications for this visit. Health Maintenance   Topic Date Due    Pneumococcal 0-64 years Vaccine (1 - PCV) Never done    HIV screen  Never done    Hepatitis C screen  Never done    DTaP/Tdap/Td vaccine (1 - Tdap) Never done    Diabetes screen  Never done    Colorectal Cancer Screen  Never done    Shingles vaccine (1 of 2) Never done    Lipids  01/29/2022    COVID-19 Vaccine (4 - Booster for Moderna series) 03/31/2022    Flu vaccine (1) Never done    Depression Screen  02/17/2024    Hepatitis A vaccine  Aged Out    Hib vaccine  Aged Out    Meningococcal (ACWY) vaccine  Aged Out             Review of Systems  Review of Systems   Constitutional:  Negative for fatigue and fever. HENT:  Positive for hearing loss. Respiratory:  Negative for cough and shortness of breath. Cardiovascular:  Negative for chest pain. Gastrointestinal:  Negative for abdominal pain. Genitourinary:  Negative for difficulty urinating. Musculoskeletal:  Positive for arthralgias and myalgias. BP (!) 142/84 (Site: Right Upper Arm, Position: Sitting)   Pulse 82   Temp 98.1 °F (36.7 °C) (Temporal)   Resp 16   Ht 5' 7\" (1.702 m)   Wt 228 lb (103.4 kg)   SpO2 99%   BMI 35.71 kg/m²     Wt Readings from Last 3 Encounters:   02/21/23 228 lb (103.4 kg)   08/16/22 225 lb (102.1 kg)   02/15/22 231 lb 6.4 oz (105 kg)       Physical Exam    Physical Exam  Vitals and nursing note reviewed. Constitutional:       Appearance: Normal appearance. HENT:      Head: Normocephalic and atraumatic. Right Ear: Tympanic membrane normal.      Left Ear: Tympanic membrane normal.      Nose: Nose normal.      Mouth/Throat:      Mouth: Mucous membranes are moist.   Eyes:      Extraocular Movements: Extraocular movements intact. Conjunctiva/sclera: Conjunctivae normal.      Pupils: Pupils are equal, round, and reactive to light. Cardiovascular:      Rate and Rhythm: Normal rate and regular rhythm. Pulses: Normal pulses. Pulmonary:      Effort: Pulmonary effort is normal.      Breath sounds: Normal breath sounds. Abdominal:      General: Bowel sounds are normal.      Palpations: Abdomen is soft. Skin:     General: Skin is warm and dry. Neurological:      General: No focal deficit present. Mental Status: He is alert. Assessment & Plan    Encounter Diagnoses   Name Primary? Essential (primary) hypertension Yes    Mixed hyperlipidemia     Obesity, morbid ([facility] [address]     Primary osteoarthritis involving multiple joints        No orders of the defined types were placed in this encounter. Orders Placed This Encounter   Procedures    TSH with Reflex     Standing Status:   Future     Standing Expiration Date:   3/1/2024    Lipid Panel     Standing Status:   Future     Standing Expiration Date:   3/1/2024    CBC with Auto Differential     Standing Status:   Future     Standing Expiration Date:   3/1/2024    Comprehensive Metabolic Panel     Standing Status:   Future     Standing Expiration Date:   3/1/2024       The patient is asked to make an attempt to improve diet and exercise patterns to aid in medical management of this problem. Chronic medical issues are stable. No change in medications. Return for routine follow up of chronic medical issues.         Federico Sultana MD

## 2023-02-27 DIAGNOSIS — I10 ESSENTIAL (PRIMARY) HYPERTENSION: ICD-10-CM

## 2023-02-27 DIAGNOSIS — E78.2 MIXED HYPERLIPIDEMIA: Primary | ICD-10-CM

## 2023-03-01 ASSESSMENT — ENCOUNTER SYMPTOMS
ABDOMINAL PAIN: 0
COUGH: 0

## 2023-03-02 ENCOUNTER — NURSE ONLY (OUTPATIENT)
Dept: INTERNAL MEDICINE CLINIC | Facility: CLINIC | Age: 63
End: 2023-03-02

## 2023-03-02 DIAGNOSIS — E78.2 MIXED HYPERLIPIDEMIA: ICD-10-CM

## 2023-03-02 DIAGNOSIS — I10 ESSENTIAL (PRIMARY) HYPERTENSION: ICD-10-CM

## 2023-03-02 LAB
BASOPHILS # BLD: 0 K/UL (ref 0–0.2)
BASOPHILS NFR BLD: 1 % (ref 0–2)
DIFFERENTIAL METHOD BLD: ABNORMAL
EOSINOPHIL # BLD: 0.2 K/UL (ref 0–0.8)
EOSINOPHIL NFR BLD: 3 % (ref 0.5–7.8)
ERYTHROCYTE [DISTWIDTH] IN BLOOD BY AUTOMATED COUNT: 15.4 % (ref 11.9–14.6)
HCT VFR BLD AUTO: 44.1 % (ref 41.1–50.3)
HGB BLD-MCNC: 14.3 G/DL (ref 13.6–17.2)
IMM GRANULOCYTES # BLD AUTO: 0 K/UL (ref 0–0.5)
IMM GRANULOCYTES NFR BLD AUTO: 0 % (ref 0–5)
LYMPHOCYTES # BLD: 0.9 K/UL (ref 0.5–4.6)
LYMPHOCYTES NFR BLD: 11 % (ref 13–44)
MCH RBC QN AUTO: 28.8 PG (ref 26.1–32.9)
MCHC RBC AUTO-ENTMCNC: 32.4 G/DL (ref 31.4–35)
MCV RBC AUTO: 88.7 FL (ref 82–102)
MONOCYTES # BLD: 0.8 K/UL (ref 0.1–1.3)
MONOCYTES NFR BLD: 10 % (ref 4–12)
NEUTS SEG # BLD: 6.2 K/UL (ref 1.7–8.2)
NEUTS SEG NFR BLD: 75 % (ref 43–78)
NRBC # BLD: 0 K/UL (ref 0–0.2)
PLATELET # BLD AUTO: 227 K/UL (ref 150–450)
PMV BLD AUTO: 9.6 FL (ref 9.4–12.3)
RBC # BLD AUTO: 4.97 M/UL (ref 4.23–5.6)
WBC # BLD AUTO: 8.2 K/UL (ref 4.3–11.1)

## 2023-03-03 LAB
ALBUMIN SERPL-MCNC: 4.2 G/DL (ref 3.2–4.6)
ALBUMIN/GLOB SERPL: 1.7 (ref 0.4–1.6)
ALP SERPL-CCNC: 102 U/L (ref 50–136)
ALT SERPL-CCNC: 33 U/L (ref 12–65)
ANION GAP SERPL CALC-SCNC: 4 MMOL/L (ref 2–11)
AST SERPL-CCNC: 14 U/L (ref 15–37)
BILIRUB SERPL-MCNC: 0.9 MG/DL (ref 0.2–1.1)
BUN SERPL-MCNC: 20 MG/DL (ref 8–23)
CALCIUM SERPL-MCNC: 9.3 MG/DL (ref 8.3–10.4)
CHLORIDE SERPL-SCNC: 104 MMOL/L (ref 101–110)
CHOLEST SERPL-MCNC: 106 MG/DL
CO2 SERPL-SCNC: 29 MMOL/L (ref 21–32)
CREAT SERPL-MCNC: 1.1 MG/DL (ref 0.8–1.5)
GLOBULIN SER CALC-MCNC: 2.5 G/DL (ref 2.8–4.5)
GLUCOSE SERPL-MCNC: 111 MG/DL (ref 65–100)
HDLC SERPL-MCNC: 40 MG/DL (ref 40–60)
HDLC SERPL: 2.7
LDLC SERPL CALC-MCNC: 56 MG/DL
POTASSIUM SERPL-SCNC: 3.8 MMOL/L (ref 3.5–5.1)
PROT SERPL-MCNC: 6.7 G/DL (ref 6.3–8.2)
SODIUM SERPL-SCNC: 137 MMOL/L (ref 133–143)
TRIGL SERPL-MCNC: 50 MG/DL (ref 35–150)
TSH W FREE THYROID IF ABNORMAL: 1.46 UIU/ML (ref 0.36–3.74)
VLDLC SERPL CALC-MCNC: 10 MG/DL (ref 6–23)

## 2023-04-25 NOTE — H&P
Date of Surgery Update:  Mark Malagon was seen and examined. History and physical has been reviewed. The patient has been examined.  There have been no significant clinical changes since the completion of the originally dated History and Physical.    Signed By: Dolly Miles MD     July 6, 2018 6:31 AM MRI faxed to [facility] and patient is notified. Kiya TRUONG RN

## 2023-08-19 ASSESSMENT — PATIENT HEALTH QUESTIONNAIRE - PHQ9
2. FEELING DOWN, DEPRESSED OR HOPELESS: 0
SUM OF ALL RESPONSES TO PHQ QUESTIONS 1-9: 0
SUM OF ALL RESPONSES TO PHQ9 QUESTIONS 1 & 2: 0
SUM OF ALL RESPONSES TO PHQ9 QUESTIONS 1 & 2: 0
SUM OF ALL RESPONSES TO PHQ QUESTIONS 1-9: 0
2. FEELING DOWN, DEPRESSED OR HOPELESS: NOT AT ALL
1. LITTLE INTEREST OR PLEASURE IN DOING THINGS: NOT AT ALL
SUM OF ALL RESPONSES TO PHQ QUESTIONS 1-9: 0
SUM OF ALL RESPONSES TO PHQ QUESTIONS 1-9: 0
1. LITTLE INTEREST OR PLEASURE IN DOING THINGS: 0

## 2023-08-22 ENCOUNTER — OFFICE VISIT (OUTPATIENT)
Dept: INTERNAL MEDICINE CLINIC | Facility: CLINIC | Age: 63
End: 2023-08-22
Payer: COMMERCIAL

## 2023-08-22 VITALS
DIASTOLIC BLOOD PRESSURE: 70 MMHG | OXYGEN SATURATION: 99 % | HEART RATE: 83 BPM | HEIGHT: 67 IN | TEMPERATURE: 97.5 F | BODY MASS INDEX: 35.31 KG/M2 | RESPIRATION RATE: 16 BRPM | WEIGHT: 225 LBS | SYSTOLIC BLOOD PRESSURE: 128 MMHG

## 2023-08-22 DIAGNOSIS — I10 ESSENTIAL (PRIMARY) HYPERTENSION: Primary | ICD-10-CM

## 2023-08-22 DIAGNOSIS — M17.0 ARTHRITIS OF BOTH KNEES: ICD-10-CM

## 2023-08-22 DIAGNOSIS — E78.2 MIXED HYPERLIPIDEMIA: ICD-10-CM

## 2023-08-22 DIAGNOSIS — E66.01 MORBID (SEVERE) OBESITY DUE TO EXCESS CALORIES (HCC): ICD-10-CM

## 2023-08-22 PROCEDURE — 3074F SYST BP LT 130 MM HG: CPT | Performed by: INTERNAL MEDICINE

## 2023-08-22 PROCEDURE — 99214 OFFICE O/P EST MOD 30 MIN: CPT | Performed by: INTERNAL MEDICINE

## 2023-08-22 PROCEDURE — 3078F DIAST BP <80 MM HG: CPT | Performed by: INTERNAL MEDICINE

## 2023-08-22 RX ORDER — VALSARTAN AND HYDROCHLOROTHIAZIDE 160; 12.5 MG/1; MG/1
1 TABLET, FILM COATED ORAL DAILY
Qty: 90 TABLET | Refills: 3 | Status: SHIPPED | OUTPATIENT
Start: 2023-08-22

## 2023-08-22 RX ORDER — AMLODIPINE BESYLATE 5 MG/1
5 TABLET ORAL DAILY
Qty: 90 TABLET | Refills: 3 | Status: SHIPPED | OUTPATIENT
Start: 2023-08-22

## 2023-08-22 NOTE — PROGRESS NOTES
08/22/2023   Location:[address] [facility]  [place]  Patient #:  [id number]  YOB: 1960        History of Present Illness     Chief Complaint   Patient presents with    Follow-up Chronic Condition     6 month f/u       Mr. Ana Waller is a 57 y.o. male  who presents for Follow up on chronic medical issues. There is compliance and tolerance with medications. Chronic acitive medical issues HTN, HLD,elevated ferritin AND OA. HTN and lipids controlled  on current meds. No new complaints. Getting right TKR tomorrow.    Last Labs  CBC:   Lab Results   Component Value Date/Time    WBC 8.2 03/02/2023 08:25 AM    RBC 4.97 03/02/2023 08:25 AM    HGB 14.3 03/02/2023 08:25 AM    HCT 44.1 03/02/2023 08:25 AM    MCV 88.7 03/02/2023 08:25 AM    MCH 28.8 03/02/2023 08:25 AM    MCHC 32.4 03/02/2023 08:25 AM    RDW 15.4 03/02/2023 08:25 AM     03/02/2023 08:25 AM    MPV 9.6 03/02/2023 08:25 AM     CMP:    Lab Results   Component Value Date/Time     03/02/2023 08:25 AM    K 3.8 03/02/2023 08:25 AM     03/02/2023 08:25 AM    CO2 29 03/02/2023 08:25 AM    BUN 20 03/02/2023 08:25 AM    CREATININE 1.10 03/02/2023 08:25 AM    GFRAA 82 02/15/2022 09:19 AM    AGRATIO 2.1 02/15/2022 09:19 AM    LABGLOM >60 03/02/2023 08:25 AM    GLUCOSE 111 03/02/2023 08:25 AM    PROT 6.7 03/02/2023 08:25 AM    LABALBU 4.2 03/02/2023 08:25 AM    CALCIUM 9.3 03/02/2023 08:25 AM    BILITOT 0.9 03/02/2023 08:25 AM    ALKPHOS 102 03/02/2023 08:25 AM    ALKPHOS 110 02/15/2022 09:19 AM    AST 14 03/02/2023 08:25 AM    ALT 33 03/02/2023 08:25 AM     HgBA1c:  No results found for: LABA1C  FLP:    Lab Results   Component Value Date/Time    TRIG 50 03/02/2023 08:25 AM    HDL 40 03/02/2023 08:25 AM    LDLCALC 56 03/02/2023 08:25 AM    LABVLDL 10 03/02/2023 08:25 AM     TSH:    Lab Results   Component Value Date/Time    TSH 1.650 02/15/2022 09:19 AM       No Known Allergies  Past Medical History:

## 2023-08-28 ASSESSMENT — ENCOUNTER SYMPTOMS
SHORTNESS OF BREATH: 0
COUGH: 0
ABDOMINAL PAIN: 0

## 2023-11-16 ENCOUNTER — OFFICE VISIT (OUTPATIENT)
Dept: INTERNAL MEDICINE CLINIC | Facility: CLINIC | Age: 63
End: 2023-11-16

## 2023-11-16 VITALS
BODY MASS INDEX: 34.21 KG/M2 | HEIGHT: 67 IN | SYSTOLIC BLOOD PRESSURE: 136 MMHG | OXYGEN SATURATION: 99 % | HEART RATE: 80 BPM | DIASTOLIC BLOOD PRESSURE: 87 MMHG | WEIGHT: 218 LBS | TEMPERATURE: 97.6 F

## 2023-11-16 DIAGNOSIS — M79.672 PAIN OF LEFT HEEL: Primary | ICD-10-CM

## 2023-11-16 DIAGNOSIS — M77.32 HEEL SPUR, LEFT: ICD-10-CM

## 2023-11-16 RX ORDER — TRIAMCINOLONE ACETONIDE 40 MG/ML
20 INJECTION, SUSPENSION INTRA-ARTICULAR; INTRAMUSCULAR ONCE
Status: COMPLETED | OUTPATIENT
Start: 2023-11-16 | End: 2023-11-16

## 2023-11-16 RX ADMIN — TRIAMCINOLONE ACETONIDE 20 MG: 40 INJECTION, SUSPENSION INTRA-ARTICULAR; INTRAMUSCULAR at 09:48

## 2023-11-16 ASSESSMENT — ENCOUNTER SYMPTOMS
SHORTNESS OF BREATH: 0
VOMITING: 0
NAUSEA: 0

## 2023-11-20 ENCOUNTER — PATIENT MESSAGE (OUTPATIENT)
Dept: INTERNAL MEDICINE CLINIC | Facility: CLINIC | Age: 63
End: 2023-11-20

## 2023-11-20 DIAGNOSIS — M79.672 LEFT FOOT PAIN: Primary | ICD-10-CM

## 2023-11-21 NOTE — TELEPHONE ENCOUNTER
From: Misty Sauer  Sent: 11/20/2023 9:06 PM EST  To: [facility] Clinical Staff  Subject: how are you? Bottom much better, my wife had a plantar brace that I use at night. Top of foot is another story. If bare foot not so bad but once shoes are on really loose no difference, pain across top, ankle and toes if I push off. In [place] now and will ice it tonight. Might need to take you up on the foot doctor. Thanks for checking in on me. Hope you and family have a blessed Thanksgiving!

## 2024-02-13 RX ORDER — ATORVASTATIN CALCIUM 20 MG/1
TABLET, FILM COATED ORAL
Qty: 90 TABLET | Refills: 3 | Status: SHIPPED | OUTPATIENT
Start: 2024-02-13

## 2024-02-27 ENCOUNTER — OFFICE VISIT (OUTPATIENT)
Dept: INTERNAL MEDICINE CLINIC | Facility: CLINIC | Age: 64
End: 2024-02-27
Payer: COMMERCIAL

## 2024-02-27 VITALS
OXYGEN SATURATION: 100 % | SYSTOLIC BLOOD PRESSURE: 142 MMHG | WEIGHT: 221 LBS | HEIGHT: 67 IN | TEMPERATURE: 98.1 F | BODY MASS INDEX: 34.69 KG/M2 | DIASTOLIC BLOOD PRESSURE: 80 MMHG | HEART RATE: 93 BPM

## 2024-02-27 DIAGNOSIS — E78.2 MIXED HYPERLIPIDEMIA: Primary | ICD-10-CM

## 2024-02-27 DIAGNOSIS — R35.1 NOCTURIA: ICD-10-CM

## 2024-02-27 DIAGNOSIS — E66.01 MORBID (SEVERE) OBESITY DUE TO EXCESS CALORIES (HCC): ICD-10-CM

## 2024-02-27 DIAGNOSIS — I10 ESSENTIAL (PRIMARY) HYPERTENSION: ICD-10-CM

## 2024-02-27 LAB
ALBUMIN SERPL-MCNC: 4.3 G/DL (ref 3.2–4.6)
ALBUMIN/GLOB SERPL: 1.7 (ref 0.4–1.6)
ALP SERPL-CCNC: 114 U/L (ref 50–136)
ALT SERPL-CCNC: 26 U/L (ref 12–65)
ANION GAP SERPL CALC-SCNC: 4 MMOL/L (ref 2–11)
AST SERPL-CCNC: 18 U/L (ref 15–37)
BASOPHILS # BLD: 0.1 K/UL (ref 0–0.2)
BASOPHILS NFR BLD: 1 % (ref 0–2)
BILIRUB SERPL-MCNC: 0.9 MG/DL (ref 0.2–1.1)
BUN SERPL-MCNC: 23 MG/DL (ref 8–23)
CALCIUM SERPL-MCNC: 9.5 MG/DL (ref 8.3–10.4)
CHLORIDE SERPL-SCNC: 105 MMOL/L (ref 103–113)
CHOLEST SERPL-MCNC: 111 MG/DL
CO2 SERPL-SCNC: 33 MMOL/L (ref 21–32)
CREAT SERPL-MCNC: 1.2 MG/DL (ref 0.8–1.5)
DIFFERENTIAL METHOD BLD: ABNORMAL
EOSINOPHIL # BLD: 0.2 K/UL (ref 0–0.8)
EOSINOPHIL NFR BLD: 3 % (ref 0.5–7.8)
ERYTHROCYTE [DISTWIDTH] IN BLOOD BY AUTOMATED COUNT: 15.3 % (ref 11.9–14.6)
GLOBULIN SER CALC-MCNC: 2.6 G/DL (ref 2.8–4.5)
GLUCOSE SERPL-MCNC: 104 MG/DL (ref 65–100)
HCT VFR BLD AUTO: 40.8 % (ref 41.1–50.3)
HDLC SERPL-MCNC: 43 MG/DL (ref 40–60)
HDLC SERPL: 2.6
HGB BLD-MCNC: 13.2 G/DL (ref 13.6–17.2)
IMM GRANULOCYTES # BLD AUTO: 0 K/UL (ref 0–0.5)
IMM GRANULOCYTES NFR BLD AUTO: 0 % (ref 0–5)
LDLC SERPL CALC-MCNC: 56.6 MG/DL
LYMPHOCYTES # BLD: 1 K/UL (ref 0.5–4.6)
LYMPHOCYTES NFR BLD: 13 % (ref 13–44)
MCH RBC QN AUTO: 29 PG (ref 26.1–32.9)
MCHC RBC AUTO-ENTMCNC: 32.4 G/DL (ref 31.4–35)
MCV RBC AUTO: 89.7 FL (ref 82–102)
MONOCYTES # BLD: 0.5 K/UL (ref 0.1–1.3)
MONOCYTES NFR BLD: 7 % (ref 4–12)
NEUTS SEG # BLD: 5.6 K/UL (ref 1.7–8.2)
NEUTS SEG NFR BLD: 76 % (ref 43–78)
NRBC # BLD: 0 K/UL (ref 0–0.2)
PLATELET # BLD AUTO: 223 K/UL (ref 150–450)
PMV BLD AUTO: 9.2 FL (ref 9.4–12.3)
POTASSIUM SERPL-SCNC: 4 MMOL/L (ref 3.5–5.1)
PROT SERPL-MCNC: 6.9 G/DL (ref 6.3–8.2)
PSA SERPL-MCNC: 3.1 NG/ML
RBC # BLD AUTO: 4.55 M/UL (ref 4.23–5.6)
SODIUM SERPL-SCNC: 142 MMOL/L (ref 136–146)
TRIGL SERPL-MCNC: 57 MG/DL (ref 35–150)
TSH W FREE THYROID IF ABNORMAL: 1.5 UIU/ML (ref 0.36–3.74)
VLDLC SERPL CALC-MCNC: 11.4 MG/DL (ref 6–23)
WBC # BLD AUTO: 7.4 K/UL (ref 4.3–11.1)

## 2024-02-27 PROCEDURE — 3079F DIAST BP 80-89 MM HG: CPT | Performed by: INTERNAL MEDICINE

## 2024-02-27 PROCEDURE — 99213 OFFICE O/P EST LOW 20 MIN: CPT | Performed by: INTERNAL MEDICINE

## 2024-02-27 PROCEDURE — 1123F ACP DISCUSS/DSCN MKR DOCD: CPT | Performed by: INTERNAL MEDICINE

## 2024-02-27 PROCEDURE — 3077F SYST BP >= 140 MM HG: CPT | Performed by: INTERNAL MEDICINE

## 2024-02-27 NOTE — PROGRESS NOTES
Normal appearance.   HENT:      Head: Normocephalic and atraumatic.      Right Ear: Tympanic membrane normal.      Left Ear: Tympanic membrane normal.      Nose: Nose normal.      Mouth/Throat:      Mouth: Mucous membranes are moist.   Eyes:      Extraocular Movements: Extraocular movements intact.      Conjunctiva/sclera: Conjunctivae normal.      Pupils: Pupils are equal, round, and reactive to light.   Cardiovascular:      Rate and Rhythm: Normal rate and regular rhythm.      Pulses: Normal pulses.   Pulmonary:      Effort: Pulmonary effort is normal.      Breath sounds: Normal breath sounds.   Abdominal:      General: Bowel sounds are normal.      Palpations: Abdomen is soft.   Skin:     General: Skin is warm and dry.   Neurological:      General: No focal deficit present.      Mental Status: He is alert.             Assessment & Plan    There are no diagnoses linked to this encounter.       Encounter Diagnoses   Name Primary?    Mixed hyperlipidemia Yes    Essential (primary) hypertension     Morbid (severe) obesity due to excess calories (HCC)     Nocturia        No orders of the defined types were placed in this encounter.      Orders Placed This Encounter   Procedures    CBC with Auto Differential     Standing Status:   Future     Number of Occurrences:   1     Standing Expiration Date:   2/27/2025    Comprehensive Metabolic Panel     Standing Status:   Future     Number of Occurrences:   1     Standing Expiration Date:   2/27/2025    TSH with Reflex     Standing Status:   Future     Number of Occurrences:   1     Standing Expiration Date:   2/27/2025    Lipid Panel     Standing Status:   Future     Number of Occurrences:   1     Standing Expiration Date:   2/27/2025    PSA, Diagnostic     Standing Status:   Future     Number of Occurrences:   1     Standing Expiration Date:   2/27/2025     Discussed the importance of regular exercise and a healthy diet.   Chronic medical issues are stable. No change in

## 2024-03-01 ENCOUNTER — TELEPHONE (OUTPATIENT)
Dept: INTERNAL MEDICINE CLINIC | Facility: CLINIC | Age: 64
End: 2024-03-01

## 2024-03-01 NOTE — TELEPHONE ENCOUNTER
----- Message from Silva Razo MD sent at 3/1/2024  4:32 PM EST -----  Recent labs were reviewed. Glucose/Blood sugar was 104. Normal is less than 100..  Kidney function, liver function and thyroid function  were normal.  PSA prostate cancer screening test was normal.   Cholesterol was good   Continue the same.

## 2024-03-01 NOTE — RESULT ENCOUNTER NOTE
Recent labs were reviewed. Glucose/Blood sugar was 104. Normal is less than 100..  Kidney function, liver function and thyroid function  were normal.  PSA prostate cancer screening test was normal.   Cholesterol was good   Continue the same.

## 2024-03-05 ASSESSMENT — ENCOUNTER SYMPTOMS
ABDOMINAL PAIN: 0
SHORTNESS OF BREATH: 0
COUGH: 0

## 2024-08-31 SDOH — ECONOMIC STABILITY: FOOD INSECURITY: WITHIN THE PAST 12 MONTHS, THE FOOD YOU BOUGHT JUST DIDN'T LAST AND YOU DIDN'T HAVE MONEY TO GET MORE.: NEVER TRUE

## 2024-08-31 SDOH — ECONOMIC STABILITY: INCOME INSECURITY: HOW HARD IS IT FOR YOU TO PAY FOR THE VERY BASICS LIKE FOOD, HOUSING, MEDICAL CARE, AND HEATING?: NOT HARD AT ALL

## 2024-08-31 SDOH — ECONOMIC STABILITY: FOOD INSECURITY: WITHIN THE PAST 12 MONTHS, YOU WORRIED THAT YOUR FOOD WOULD RUN OUT BEFORE YOU GOT MONEY TO BUY MORE.: NEVER TRUE

## 2024-08-31 SDOH — ECONOMIC STABILITY: TRANSPORTATION INSECURITY
IN THE PAST 12 MONTHS, HAS LACK OF TRANSPORTATION KEPT YOU FROM MEETINGS, WORK, OR FROM GETTING THINGS NEEDED FOR DAILY LIVING?: NO

## 2024-08-31 ASSESSMENT — PATIENT HEALTH QUESTIONNAIRE - PHQ9
2. FEELING DOWN, DEPRESSED OR HOPELESS: NOT AT ALL
SUM OF ALL RESPONSES TO PHQ QUESTIONS 1-9: 0
1. LITTLE INTEREST OR PLEASURE IN DOING THINGS: NOT AT ALL
SUM OF ALL RESPONSES TO PHQ QUESTIONS 1-9: 0
2. FEELING DOWN, DEPRESSED OR HOPELESS: NOT AT ALL
SUM OF ALL RESPONSES TO PHQ9 QUESTIONS 1 & 2: 0
SUM OF ALL RESPONSES TO PHQ9 QUESTIONS 1 & 2: 0
1. LITTLE INTEREST OR PLEASURE IN DOING THINGS: NOT AT ALL

## 2024-09-03 ENCOUNTER — OFFICE VISIT (OUTPATIENT)
Dept: INTERNAL MEDICINE CLINIC | Facility: CLINIC | Age: 64
End: 2024-09-03
Payer: COMMERCIAL

## 2024-09-03 VITALS
DIASTOLIC BLOOD PRESSURE: 90 MMHG | TEMPERATURE: 97.8 F | HEART RATE: 93 BPM | BODY MASS INDEX: 38.24 KG/M2 | SYSTOLIC BLOOD PRESSURE: 146 MMHG | OXYGEN SATURATION: 100 % | WEIGHT: 224 LBS | HEIGHT: 64 IN

## 2024-09-03 DIAGNOSIS — E78.2 MIXED HYPERLIPIDEMIA: ICD-10-CM

## 2024-09-03 DIAGNOSIS — I10 ESSENTIAL (PRIMARY) HYPERTENSION: Primary | ICD-10-CM

## 2024-09-03 DIAGNOSIS — E66.01 MORBID (SEVERE) OBESITY DUE TO EXCESS CALORIES (HCC): ICD-10-CM

## 2024-09-03 DIAGNOSIS — M77.32 HEEL SPUR, LEFT: ICD-10-CM

## 2024-09-03 PROCEDURE — 3077F SYST BP >= 140 MM HG: CPT | Performed by: INTERNAL MEDICINE

## 2024-09-03 PROCEDURE — 99213 OFFICE O/P EST LOW 20 MIN: CPT | Performed by: INTERNAL MEDICINE

## 2024-09-03 PROCEDURE — 1123F ACP DISCUSS/DSCN MKR DOCD: CPT | Performed by: INTERNAL MEDICINE

## 2024-09-03 PROCEDURE — 3080F DIAST BP >= 90 MM HG: CPT | Performed by: INTERNAL MEDICINE

## 2024-09-03 RX ORDER — VALSARTAN AND HYDROCHLOROTHIAZIDE 160; 12.5 MG/1; MG/1
1 TABLET, FILM COATED ORAL DAILY
Qty: 90 TABLET | Refills: 3 | Status: SHIPPED | OUTPATIENT
Start: 2024-09-03

## 2024-09-03 RX ORDER — AMLODIPINE BESYLATE 5 MG/1
5 TABLET ORAL DAILY
Qty: 90 TABLET | Refills: 3 | Status: SHIPPED | OUTPATIENT
Start: 2024-09-03

## 2025-02-21 DIAGNOSIS — I10 HTN (HYPERTENSION), BENIGN: Primary | ICD-10-CM

## 2025-02-21 DIAGNOSIS — R35.1 NOCTURIA: ICD-10-CM

## 2025-02-21 RX ORDER — ATORVASTATIN CALCIUM 20 MG/1
TABLET, FILM COATED ORAL
Qty: 90 TABLET | Refills: 3 | Status: SHIPPED | OUTPATIENT
Start: 2025-02-21

## 2025-03-01 SDOH — ECONOMIC STABILITY: FOOD INSECURITY: WITHIN THE PAST 12 MONTHS, YOU WORRIED THAT YOUR FOOD WOULD RUN OUT BEFORE YOU GOT MONEY TO BUY MORE.: NEVER TRUE

## 2025-03-01 SDOH — ECONOMIC STABILITY: TRANSPORTATION INSECURITY
IN THE PAST 12 MONTHS, HAS THE LACK OF TRANSPORTATION KEPT YOU FROM MEDICAL APPOINTMENTS OR FROM GETTING MEDICATIONS?: NO

## 2025-03-01 SDOH — ECONOMIC STABILITY: INCOME INSECURITY: IN THE LAST 12 MONTHS, WAS THERE A TIME WHEN YOU WERE NOT ABLE TO PAY THE MORTGAGE OR RENT ON TIME?: NO

## 2025-03-01 SDOH — ECONOMIC STABILITY: FOOD INSECURITY: WITHIN THE PAST 12 MONTHS, THE FOOD YOU BOUGHT JUST DIDN'T LAST AND YOU DIDN'T HAVE MONEY TO GET MORE.: NEVER TRUE

## 2025-03-01 ASSESSMENT — PATIENT HEALTH QUESTIONNAIRE - PHQ9
1. LITTLE INTEREST OR PLEASURE IN DOING THINGS: NOT AT ALL
2. FEELING DOWN, DEPRESSED OR HOPELESS: NOT AT ALL
SUM OF ALL RESPONSES TO PHQ QUESTIONS 1-9: 0
SUM OF ALL RESPONSES TO PHQ QUESTIONS 1-9: 0
1. LITTLE INTEREST OR PLEASURE IN DOING THINGS: NOT AT ALL
SUM OF ALL RESPONSES TO PHQ9 QUESTIONS 1 & 2: 0
2. FEELING DOWN, DEPRESSED OR HOPELESS: NOT AT ALL
SUM OF ALL RESPONSES TO PHQ QUESTIONS 1-9: 0
SUM OF ALL RESPONSES TO PHQ QUESTIONS 1-9: 0

## 2025-03-03 ENCOUNTER — OFFICE VISIT (OUTPATIENT)
Dept: INTERNAL MEDICINE CLINIC | Facility: CLINIC | Age: 65
End: 2025-03-03
Payer: COMMERCIAL

## 2025-03-03 VITALS
TEMPERATURE: 97.6 F | BODY MASS INDEX: 39.44 KG/M2 | WEIGHT: 231 LBS | DIASTOLIC BLOOD PRESSURE: 82 MMHG | SYSTOLIC BLOOD PRESSURE: 130 MMHG | OXYGEN SATURATION: 100 % | HEART RATE: 99 BPM | HEIGHT: 64 IN

## 2025-03-03 DIAGNOSIS — E66.01 OBESITY, MORBID: ICD-10-CM

## 2025-03-03 DIAGNOSIS — I10 ESSENTIAL (PRIMARY) HYPERTENSION: Primary | ICD-10-CM

## 2025-03-03 DIAGNOSIS — M15.0 PRIMARY OSTEOARTHRITIS INVOLVING MULTIPLE JOINTS: ICD-10-CM

## 2025-03-03 DIAGNOSIS — E78.2 MIXED HYPERLIPIDEMIA: ICD-10-CM

## 2025-03-03 PROCEDURE — 1123F ACP DISCUSS/DSCN MKR DOCD: CPT | Performed by: INTERNAL MEDICINE

## 2025-03-03 PROCEDURE — 3075F SYST BP GE 130 - 139MM HG: CPT | Performed by: INTERNAL MEDICINE

## 2025-03-03 PROCEDURE — 3079F DIAST BP 80-89 MM HG: CPT | Performed by: INTERNAL MEDICINE

## 2025-03-03 PROCEDURE — 99214 OFFICE O/P EST MOD 30 MIN: CPT | Performed by: INTERNAL MEDICINE

## 2025-03-03 PROCEDURE — G2211 COMPLEX E/M VISIT ADD ON: HCPCS | Performed by: INTERNAL MEDICINE

## 2025-03-03 RX ORDER — CELECOXIB 200 MG/1
200 CAPSULE ORAL 2 TIMES DAILY
Qty: 180 CAPSULE | Refills: 3 | Status: SHIPPED | OUTPATIENT
Start: 2025-03-03

## 2025-03-03 RX ORDER — VALSARTAN AND HYDROCHLOROTHIAZIDE 160; 12.5 MG/1; MG/1
1 TABLET, FILM COATED ORAL DAILY
Qty: 90 TABLET | Refills: 3 | Status: SHIPPED | OUTPATIENT
Start: 2025-03-03

## 2025-03-03 RX ORDER — AMLODIPINE BESYLATE 5 MG/1
5 TABLET ORAL DAILY
Qty: 90 TABLET | Refills: 3 | Status: SHIPPED | OUTPATIENT
Start: 2025-03-03

## 2025-03-03 RX ORDER — ATORVASTATIN CALCIUM 20 MG/1
20 TABLET, FILM COATED ORAL DAILY
Qty: 90 TABLET | Refills: 3 | Status: SHIPPED | OUTPATIENT
Start: 2025-03-03

## 2025-03-03 SDOH — ECONOMIC STABILITY: FOOD INSECURITY: WITHIN THE PAST 12 MONTHS, YOU WORRIED THAT YOUR FOOD WOULD RUN OUT BEFORE YOU GOT MONEY TO BUY MORE.: NEVER TRUE

## 2025-03-03 SDOH — ECONOMIC STABILITY: FOOD INSECURITY: WITHIN THE PAST 12 MONTHS, THE FOOD YOU BOUGHT JUST DIDN'T LAST AND YOU DIDN'T HAVE MONEY TO GET MORE.: NEVER TRUE

## 2025-03-03 NOTE — PROGRESS NOTES
Discussed with the patient the current USPSTF guidelines released March 9, 2021 for screening for lung cancer.    For adults aged 50 to 80 years who have a 20 pack-year smoking history and currently smoke or have quit within the past 15 years the grade B recommendation is to:  Screen for lung cancer with low-dose computed tomography (LDCT) every year.  Stop screening once a person has not smoked for 15 years or has a health problem that limits life expectancy or the ability to have lung surgery.    The patient  reports that he has been smoking cigars and cigarettes. He has a 25 pack-year smoking history. He has never used smokeless tobacco.. Discussed with patient the risks and benefits of screening, including over-diagnosis, false positive rate, and total radiation exposure.  The patient currently exhibits no signs or symptoms suggestive of lung cancer.  Discussed with patient the importance of compliance with yearly annual lung cancer screenings and willingness to undergo diagnosis and treatment if screening scan is positive.  In addition, the patient was counseled regarding the importance of remaining smoke free and/or total smoking cessation.    Also reviewed the following if the patient has Medicare that as of February 10, 2022, Medicare only covers LDCT screening in patients aged 50-77 with at least a 20 pack-year smoking history who currently smoke or have quit in the last 15 years  
Normal breath sounds.   Abdominal:      General: Bowel sounds are normal.      Palpations: Abdomen is soft.   Skin:     General: Skin is warm and dry.   Neurological:      General: No focal deficit present.      Mental Status: He is alert.   Psychiatric:         Mood and Affect: Mood normal.             Assessment & Plan       Encounter Diagnoses   Name Primary?    Essential (primary) hypertension Yes    Mixed hyperlipidemia     Obesity, morbid     Primary osteoarthritis involving multiple joints        Orders Placed This Encounter   Medications    celecoxib (CELEBREX) 200 MG capsule     Sig: Take 1 capsule by mouth 2 times daily     Dispense:  180 capsule     Refill:  3    amLODIPine (NORVASC) 5 MG tablet     Sig: Take 1 tablet by mouth daily     Dispense:  90 tablet     Refill:  3    atorvastatin (LIPITOR) 20 MG tablet     Sig: Take 1 tablet by mouth daily     Dispense:  90 tablet     Refill:  3    valsartan-hydroCHLOROthiazide (DIOVAN-HCT) 160-12.5 MG per tablet     Sig: Take 1 tablet by mouth daily     Dispense:  90 tablet     Refill:  3       No orders of the defined types were placed in this encounter.    HTN controlled on Norvasc and Hyzaar  HLD controlled on Lipitor.  Arthritis pain managed with Celebrex.   The patient is asked to make an attempt to improve diet and exercise patterns to aid in medical management of this problem.  Chronic medical issues are stable. No change in medications.       Return in about 6 months (around 9/3/2025) for routine follow up of chronic medical issues, and as needed for new or worsening problems.        Silva Razo MD

## 2025-08-20 DIAGNOSIS — E78.2 MIXED HYPERLIPIDEMIA: Primary | ICD-10-CM

## 2025-08-20 DIAGNOSIS — E78.2 MIXED HYPERLIPIDEMIA: ICD-10-CM

## 2025-08-20 DIAGNOSIS — E66.01 OBESITY, MORBID (HCC): ICD-10-CM

## 2025-08-20 DIAGNOSIS — I10 ESSENTIAL (PRIMARY) HYPERTENSION: ICD-10-CM

## 2025-08-20 DIAGNOSIS — N40.0 BENIGN PROSTATIC HYPERPLASIA, UNSPECIFIED WHETHER LOWER URINARY TRACT SYMPTOMS PRESENT: ICD-10-CM

## 2025-08-20 LAB
ALBUMIN SERPL-MCNC: 4 G/DL (ref 3.2–4.6)
ALBUMIN/GLOB SERPL: 1.6 (ref 1–1.9)
ALP SERPL-CCNC: 102 U/L (ref 40–129)
ALT SERPL-CCNC: 23 U/L (ref 8–55)
ANION GAP SERPL CALC-SCNC: 14 MMOL/L (ref 7–16)
AST SERPL-CCNC: 20 U/L (ref 15–37)
BASOPHILS # BLD: 0.03 K/UL (ref 0–0.2)
BASOPHILS NFR BLD: 0.5 % (ref 0–2)
BILIRUB SERPL-MCNC: 0.9 MG/DL (ref 0–1.2)
BUN SERPL-MCNC: 20 MG/DL (ref 8–23)
CALCIUM SERPL-MCNC: 9.4 MG/DL (ref 8.8–10.2)
CHLORIDE SERPL-SCNC: 99 MMOL/L (ref 98–107)
CHOLEST SERPL-MCNC: 95 MG/DL (ref 0–200)
CO2 SERPL-SCNC: 27 MMOL/L (ref 20–29)
CREAT SERPL-MCNC: 1.13 MG/DL (ref 0.8–1.3)
DIFFERENTIAL METHOD BLD: ABNORMAL
EOSINOPHIL # BLD: 0.16 K/UL (ref 0–0.8)
EOSINOPHIL NFR BLD: 2.7 % (ref 0.5–7.8)
ERYTHROCYTE [DISTWIDTH] IN BLOOD BY AUTOMATED COUNT: 15.7 % (ref 11.9–14.6)
GLOBULIN SER CALC-MCNC: 2.6 G/DL (ref 2.3–3.5)
GLUCOSE SERPL-MCNC: 121 MG/DL (ref 70–99)
HCT VFR BLD AUTO: 40.5 % (ref 41.1–50.3)
HDLC SERPL-MCNC: 35 MG/DL (ref 40–60)
HDLC SERPL: 2.7 (ref 0–5)
HGB BLD-MCNC: 13.1 G/DL (ref 13.6–17.2)
IMM GRANULOCYTES # BLD AUTO: 0.03 K/UL (ref 0–0.5)
IMM GRANULOCYTES NFR BLD AUTO: 0.5 % (ref 0–5)
LDLC SERPL CALC-MCNC: 50 MG/DL (ref 0–100)
LYMPHOCYTES # BLD: 0.59 K/UL (ref 0.5–4.6)
LYMPHOCYTES NFR BLD: 9.8 % (ref 13–44)
MCH RBC QN AUTO: 29.6 PG (ref 26.1–32.9)
MCHC RBC AUTO-ENTMCNC: 32.3 G/DL (ref 31.4–35)
MCV RBC AUTO: 91.4 FL (ref 82–102)
MONOCYTES # BLD: 0.38 K/UL (ref 0.1–1.3)
MONOCYTES NFR BLD: 6.3 % (ref 4–12)
NEUTS SEG # BLD: 4.81 K/UL (ref 1.7–8.2)
NEUTS SEG NFR BLD: 80.2 % (ref 43–78)
NRBC # BLD: 0 K/UL (ref 0–0.2)
PLATELET # BLD AUTO: 199 K/UL (ref 150–450)
PMV BLD AUTO: 9.4 FL (ref 9.4–12.3)
POTASSIUM SERPL-SCNC: 3.8 MMOL/L (ref 3.5–5.1)
PROT SERPL-MCNC: 6.6 G/DL (ref 6.3–8.2)
PSA SERPL-MCNC: 3.6 NG/ML (ref 0–4)
RBC # BLD AUTO: 4.43 M/UL (ref 4.23–5.6)
SODIUM SERPL-SCNC: 140 MMOL/L (ref 136–145)
TRIGL SERPL-MCNC: 49 MG/DL (ref 0–150)
TSH W FREE THYROID IF ABNORMAL: 1.64 UIU/ML (ref 0.27–4.2)
VLDLC SERPL CALC-MCNC: 10 MG/DL (ref 6–23)
WBC # BLD AUTO: 6 K/UL (ref 4.3–11.1)

## 2025-08-21 DIAGNOSIS — R73.01 ELEVATED FASTING GLUCOSE: ICD-10-CM

## 2025-08-21 LAB
EST. AVERAGE GLUCOSE BLD GHB EST-MCNC: 102 MG/DL
HBA1C MFR BLD: 5.2 % (ref 0–5.6)

## (undated) DEVICE — [RESECTOR CUTTER, ARTHROSCOPIC SHAVER BLADE,  DO NOT RESTERILIZE,  DO NOT USE IF PACKAGE IS DAMAGED,  KEEP DRY,  KEEP AWAY FROM SUNLIGHT]: Brand: FORMULA

## (undated) DEVICE — ABDOMINAL PAD: Brand: DERMACEA

## (undated) DEVICE — SOLUTION IRRIG 3000ML 0.9% SOD CHL FLX CONT 0797208] ICU MEDICAL INC]

## (undated) DEVICE — SURGICAL PROCEDURE PACK BASIC ST FRANCIS

## (undated) DEVICE — BLADE SHV 3.5MM TOMCAT

## (undated) DEVICE — TETRA-FLEX CF WOVEN LATEX FREE ELASTIC BANDAGE 6" X 11 YD: Brand: TETRA-FLEX™CF

## (undated) DEVICE — INFLOW CASSETTE TUBING, DO NOT USE IF PACKAGE IS DAMAGED: Brand: CROSSFLOW

## (undated) DEVICE — SUT ETHLN 3-0 18IN PS1 BLK --

## (undated) DEVICE — CARDINAL HEALTH FLEXIBLE LIGHT HANDLE COVER: Brand: CARDINAL HEALTH

## (undated) DEVICE — STOCKINETTE TUBE 9X48 -- MEDICHOICE

## (undated) DEVICE — DISPOSABLE TOURNIQUET CUFF SINGLE BLADDER, DUAL PORT AND QUICK CONNECT CONNECTOR: Brand: COLOR CUFF

## (undated) DEVICE — BANDAGE COMPR SELF ADH 5 YDX4 IN TAN STRL PREMIERPRO LF

## (undated) DEVICE — NDL SPNE QNCKE 18GX3.5IN LF --

## (undated) DEVICE — DRAPE,U/SHT,SPLIT,FILM,60X84,STERILE: Brand: MEDLINE

## (undated) DEVICE — BLADE SHV CUT MENIS AGG + 4MM --

## (undated) DEVICE — GOWN,REINFORCED,POLY,AURORA,XXLARGE,STR: Brand: MEDLINE

## (undated) DEVICE — (D)PREP SKN CHLRAPRP APPL 26ML -- CONVERT TO ITEM 371833

## (undated) DEVICE — DRAPE,ORTHOMAX,ARTHRO T ,W/ POUCH: Brand: MEDLINE